# Patient Record
Sex: FEMALE | Race: WHITE | NOT HISPANIC OR LATINO | Employment: FULL TIME | URBAN - METROPOLITAN AREA
[De-identification: names, ages, dates, MRNs, and addresses within clinical notes are randomized per-mention and may not be internally consistent; named-entity substitution may affect disease eponyms.]

---

## 2017-08-01 ENCOUNTER — HOSPITAL ENCOUNTER (OUTPATIENT)
Dept: PREOP | Facility: HOSPITAL | Age: 22
Setting detail: HOSPITAL OUTPATIENT SURGERY
Discharge: HOME | End: 2017-08-01
Attending: INTERNAL MEDICINE | Admitting: INTERNAL MEDICINE

## 2017-08-01 ENCOUNTER — ON CAMPUS - OUTPATIENT (AMBULATORY)
Dept: URBAN - METROPOLITAN AREA HOSPITAL 85 | Facility: HOSPITAL | Age: 22
End: 2017-08-01

## 2017-08-01 DIAGNOSIS — K64.8 OTHER HEMORRHOIDS: ICD-10-CM

## 2017-08-01 DIAGNOSIS — K62.5 HEMORRHAGE OF ANUS AND RECTUM: ICD-10-CM

## 2017-08-01 DIAGNOSIS — K59.09 OTHER CONSTIPATION: ICD-10-CM

## 2017-08-01 PROCEDURE — 45378 DIAGNOSTIC COLONOSCOPY: CPT | Performed by: INTERNAL MEDICINE

## 2017-11-21 ENCOUNTER — OFFICE (AMBULATORY)
Dept: URBAN - METROPOLITAN AREA CLINIC 64 | Facility: CLINIC | Age: 22
End: 2017-11-21

## 2017-11-21 DIAGNOSIS — K64.1 SECOND DEGREE HEMORRHOIDS: ICD-10-CM

## 2017-11-21 PROCEDURE — 46221 LIGATION OF HEMORRHOID(S): CPT | Performed by: INTERNAL MEDICINE

## 2017-12-06 ENCOUNTER — OFFICE (AMBULATORY)
Dept: URBAN - METROPOLITAN AREA CLINIC 64 | Facility: CLINIC | Age: 22
End: 2017-12-06

## 2017-12-06 DIAGNOSIS — K64.8 OTHER HEMORRHOIDS: ICD-10-CM

## 2017-12-06 PROCEDURE — 99212 OFFICE O/P EST SF 10 MIN: CPT | Performed by: INTERNAL MEDICINE

## 2017-12-21 ENCOUNTER — OFFICE (AMBULATORY)
Dept: URBAN - METROPOLITAN AREA CLINIC 64 | Facility: CLINIC | Age: 22
End: 2017-12-21

## 2017-12-21 VITALS
HEIGHT: 63 IN | DIASTOLIC BLOOD PRESSURE: 66 MMHG | WEIGHT: 204 LBS | HEART RATE: 62 BPM | SYSTOLIC BLOOD PRESSURE: 110 MMHG

## 2017-12-21 DIAGNOSIS — K64.8 OTHER HEMORRHOIDS: ICD-10-CM

## 2017-12-21 PROCEDURE — 99213 OFFICE O/P EST LOW 20 MIN: CPT | Performed by: INTERNAL MEDICINE

## 2018-01-23 ENCOUNTER — OFFICE (AMBULATORY)
Dept: URBAN - METROPOLITAN AREA CLINIC 64 | Facility: CLINIC | Age: 23
End: 2018-01-23

## 2018-01-23 VITALS
DIASTOLIC BLOOD PRESSURE: 70 MMHG | WEIGHT: 204 LBS | HEART RATE: 85 BPM | SYSTOLIC BLOOD PRESSURE: 105 MMHG | HEIGHT: 63 IN

## 2018-01-23 DIAGNOSIS — K64.8 OTHER HEMORRHOIDS: ICD-10-CM

## 2018-01-23 DIAGNOSIS — K60.2 ANAL FISSURE, UNSPECIFIED: ICD-10-CM

## 2018-01-23 DIAGNOSIS — R10.32 LEFT LOWER QUADRANT PAIN: ICD-10-CM

## 2018-01-23 PROCEDURE — 99214 OFFICE O/P EST MOD 30 MIN: CPT | Performed by: NURSE PRACTITIONER

## 2018-01-23 RX ORDER — DICYCLOMINE HYDROCHLORIDE 20 MG/1
40 TABLET ORAL
Qty: 60 | Refills: 2 | Status: ACTIVE
Start: 2018-01-23

## 2018-01-23 RX ORDER — HYDROCORTISONE 25 MG/G
OINTMENT TOPICAL
Qty: 30 | Refills: 6 | Status: COMPLETED
Start: 2017-08-01 | End: 2018-01-23

## 2020-02-05 ENCOUNTER — HOSPITAL ENCOUNTER (EMERGENCY)
Facility: HOSPITAL | Age: 25
Discharge: HOME | End: 2020-02-05
Admitting: EMERGENCY MEDICINE

## 2020-02-05 ENCOUNTER — APPOINTMENT (OUTPATIENT)
Dept: GENERAL RADIOLOGY | Facility: HOSPITAL | Age: 25
End: 2020-02-05

## 2020-02-05 VITALS
BODY MASS INDEX: 36.72 KG/M2 | TEMPERATURE: 98.2 F | SYSTOLIC BLOOD PRESSURE: 134 MMHG | HEIGHT: 63 IN | HEART RATE: 97 BPM | RESPIRATION RATE: 17 BRPM | OXYGEN SATURATION: 95 % | WEIGHT: 207.23 LBS | DIASTOLIC BLOOD PRESSURE: 80 MMHG

## 2020-02-05 DIAGNOSIS — J11.1 INFLUENZAL BRONCHITIS: Primary | ICD-10-CM

## 2020-02-05 DIAGNOSIS — J10.1 INFLUENZA B: ICD-10-CM

## 2020-02-05 LAB
B-HCG UR QL: NEGATIVE
FLUAV SUBTYP SPEC NAA+PROBE: NOT DETECTED
FLUBV RNA ISLT QL NAA+PROBE: DETECTED
S PYO AG THROAT QL: NEGATIVE

## 2020-02-05 PROCEDURE — 81025 URINE PREGNANCY TEST: CPT | Performed by: NURSE PRACTITIONER

## 2020-02-05 PROCEDURE — 71045 X-RAY EXAM CHEST 1 VIEW: CPT

## 2020-02-05 PROCEDURE — 94640 AIRWAY INHALATION TREATMENT: CPT

## 2020-02-05 PROCEDURE — 87651 STREP A DNA AMP PROBE: CPT | Performed by: NURSE PRACTITIONER

## 2020-02-05 PROCEDURE — 87502 INFLUENZA DNA AMP PROBE: CPT | Performed by: NURSE PRACTITIONER

## 2020-02-05 PROCEDURE — 94799 UNLISTED PULMONARY SVC/PX: CPT

## 2020-02-05 PROCEDURE — 99283 EMERGENCY DEPT VISIT LOW MDM: CPT

## 2020-02-05 RX ORDER — ALBUTEROL SULFATE 90 UG/1
2 AEROSOL, METERED RESPIRATORY (INHALATION) EVERY 4 HOURS PRN
Qty: 6.7 G | Refills: 0 | Status: SHIPPED | OUTPATIENT
Start: 2020-02-05 | End: 2022-03-11

## 2020-02-05 RX ORDER — OSELTAMIVIR PHOSPHATE 75 MG/1
75 CAPSULE ORAL 2 TIMES DAILY
Qty: 10 CAPSULE | Refills: 0 | Status: SHIPPED | OUTPATIENT
Start: 2020-02-05 | End: 2020-02-10

## 2020-02-05 RX ORDER — IPRATROPIUM BROMIDE AND ALBUTEROL SULFATE 2.5; .5 MG/3ML; MG/3ML
3 SOLUTION RESPIRATORY (INHALATION) ONCE
Status: COMPLETED | OUTPATIENT
Start: 2020-02-05 | End: 2020-02-05

## 2020-02-05 RX ORDER — BROMPHENIRAMINE MALEATE, PSEUDOEPHEDRINE HYDROCHLORIDE, AND DEXTROMETHORPHAN HYDROBROMIDE 2; 30; 10 MG/5ML; MG/5ML; MG/5ML
5 SYRUP ORAL 4 TIMES DAILY PRN
Qty: 473 ML | Refills: 0 | Status: SHIPPED | OUTPATIENT
Start: 2020-02-05 | End: 2022-03-11

## 2020-02-05 RX ORDER — METHYLPREDNISOLONE 4 MG/1
TABLET ORAL
Qty: 21 TABLET | Refills: 0 | Status: SHIPPED | OUTPATIENT
Start: 2020-02-05 | End: 2022-03-11

## 2020-02-05 RX ADMIN — IPRATROPIUM BROMIDE AND ALBUTEROL SULFATE 3 ML: .5; 3 SOLUTION RESPIRATORY (INHALATION) at 10:37

## 2020-02-05 NOTE — ED PROVIDER NOTES
Subjective   24-year-old  female presents to the emergency room with complaint of cough and low-grade fever for the past 8 days.  She states that the cough is worse at night when she lays down to go to bed.  She reports a mild sore throat.  Patient denies nausea vomiting or diarrhea.  Patient reports mild body aches.  Onset: Mild cough; 8 days ago but worsening over the last 24 hours  Location: Sore throat and cough  Duration: 8 days  Character: Body aches, sore throat and nonproductive and repetitive cough  Aggravating/Alleviating Factors: Laying down flat at night to go to sleep/none  Radiation: Chest and head  Severity: Moderate            Review of Systems   Constitutional: Positive for fatigue and fever.   HENT: Positive for congestion and sore throat. Negative for trouble swallowing.    Respiratory: Positive for cough and wheezing.    Cardiovascular: Negative.    Gastrointestinal: Negative.    Genitourinary: Negative.    Musculoskeletal: Positive for arthralgias and myalgias. Negative for neck pain and neck stiffness.   Skin: Negative.  Negative for rash and wound.   Neurological: Positive for headaches. Negative for dizziness.   Hematological: Negative.    Psychiatric/Behavioral: Negative.        Past Medical History:   Diagnosis Date   • Hernia, hiatal        No Known Allergies    History reviewed. No pertinent surgical history.    No family history on file.    Social History     Socioeconomic History   • Marital status: Single     Spouse name: Not on file   • Number of children: Not on file   • Years of education: Not on file   • Highest education level: Not on file   Tobacco Use   • Smoking status: Never Smoker           Objective   Physical Exam   Constitutional: She is oriented to person, place, and time. She appears well-developed and well-nourished.   HENT:   Head: Normocephalic and atraumatic.   Right Ear: External ear normal.   Left Ear: External ear normal.   Mouth/Throat: Oropharynx is  clear and moist. No oropharyngeal exudate.   Eyes: Pupils are equal, round, and reactive to light. Conjunctivae and EOM are normal.   Neck: Normal range of motion. Neck supple.   Cardiovascular: Regular rhythm.   Pulmonary/Chest: No stridor. No respiratory distress. She has no decreased breath sounds. She has wheezes in the right middle field, the right lower field, the left middle field and the left lower field. She has rhonchi in the right middle field, the right lower field, the left middle field and the left lower field. She has no rales. She exhibits no mass, no tenderness, no deformity and no retraction.   Abdominal: Soft. Bowel sounds are normal.   Musculoskeletal: She exhibits no edema or deformity.   Neurological: She is alert and oriented to person, place, and time.   Skin: Skin is warm and dry. Capillary refill takes 2 to 3 seconds. No rash noted. No erythema.   Psychiatric: She has a normal mood and affect. Her behavior is normal. Judgment and thought content normal.   Nursing note and vitals reviewed.      Procedures           ED Course      Medications   ipratropium-albuterol (DUO-NEB) nebulizer solution 3 mL (3 mL Nebulization Given 2/5/20 1037)     Labs Reviewed   INFLUENZA ANTIGEN, RAPID - Abnormal; Notable for the following components:       Result Value    Influenza B PCR Detected (*)     All other components within normal limits   RAPID STREP A SCREEN - Normal   PREGNANCY, URINE - Normal     Xr Chest 1 View    Result Date: 2/5/2020  Negative portable chest  Electronically Signed By-Perez Koch On:2/5/2020 11:33 AM This report was finalized on [id number] by  Perez Koch, .       DuoNeb ordered and given after physical exam performed by provider.  Placed mask on patient as directed by provider.  Chest x-ray obtained and shows no acute consolidation or effusion.  Patient is flu be positive and test results explained to patient.  Medication education given to patient and patient verbalized  understanding. importance of rest and self-isolation explained to patient and patient verbalized understanding.  Discharged home with prescription for albuterol inhaler, Bromfed-DM cough syrup, Medrol Dosepak and Tamiflu.                                    [facility]    Final diagnoses:   Influenzal bronchitis   Influenza B            Sue Naik, APRN  02/05/20 1142

## 2020-02-05 NOTE — DISCHARGE INSTRUCTIONS
Rest and stay away from other people for next week.  DO NOT go in public areas and limit your contact with the public.  Drink plenty of fluids and may take tylenol/ibuprofen, as directed for body aches and fever.  DO NOT return to work until next Monday.

## 2020-02-05 NOTE — ED NOTES
Pt reports cough x8 days. Nonproductive. Low grade fever, congestion. Denies any travel or being around anyone sick.     Aline Tse, RN  02/05/20 4682

## 2022-03-11 ENCOUNTER — OFFICE VISIT (OUTPATIENT)
Dept: FAMILY MEDICINE CLINIC | Facility: CLINIC | Age: 27
End: 2022-03-11

## 2022-03-11 VITALS
BODY MASS INDEX: 37.95 KG/M2 | DIASTOLIC BLOOD PRESSURE: 74 MMHG | SYSTOLIC BLOOD PRESSURE: 126 MMHG | TEMPERATURE: 98 F | OXYGEN SATURATION: 95 % | HEIGHT: 63 IN | HEART RATE: 98 BPM | WEIGHT: 214.2 LBS

## 2022-03-11 DIAGNOSIS — F98.8 ATTENTION DEFICIT DISORDER, UNSPECIFIED HYPERACTIVITY PRESENCE: Primary | ICD-10-CM

## 2022-03-11 PROCEDURE — 99203 OFFICE O/P NEW LOW 30 MIN: CPT | Performed by: FAMILY MEDICINE

## 2022-03-11 RX ORDER — DEXTROAMPHETAMINE SACCHARATE, AMPHETAMINE ASPARTATE, DEXTROAMPHETAMINE SULFATE AND AMPHETAMINE SULFATE 5; 5; 5; 5 MG/1; MG/1; MG/1; MG/1
20 TABLET ORAL DAILY
Qty: 30 TABLET | Refills: 0 | Status: SHIPPED | OUTPATIENT
Start: 2022-03-11 | End: 2022-04-11 | Stop reason: DRUGHIGH

## 2022-03-11 NOTE — PROGRESS NOTES
Subjective   Kassi Duran is a 26 y.o. female.     History of Present Illness     The patient present with ADD and anxiety. She is C/O  difficulity concentration, focusing and anxiety but denies  depression, and  insomnia.   Patient has known history of ADD used to be on adderall.    The following portions of the patient's history were reviewed and updated as appropriate: past medical history, past social history, past surgical history and problem list.    Review of Systems   Constitutional: Positive for fatigue. Negative for activity change, appetite change and fever.   Cardiovascular: Negative for chest pain and palpitations.   Psychiatric/Behavioral: Positive for decreased concentration and stress. Negative for agitation, behavioral problems, sleep disturbance, suicidal ideas and depressed mood. The patient is nervous/anxious.        Objective   Physical Exam  Vitals reviewed.   Constitutional:       General: She is not in acute distress.  Cardiovascular:      Heart sounds: Normal heart sounds.   Pulmonary:      Effort: Pulmonary effort is normal.      Breath sounds: Normal breath sounds. No wheezing.   Abdominal:      Tenderness: There is no abdominal tenderness.   Musculoskeletal:      Cervical back: Normal range of motion and neck supple.   Neurological:      Mental Status: She is alert and oriented to person, place, and time.   Psychiatric:         Mood and Affect: Mood normal.         Behavior: Behavior normal.       Vitals:    03/11/22 0919   BP: 126/74   Pulse: 98   Temp: 98 °F (36.7 °C)   SpO2: 95%     No current outpatient medications on file prior to visit.     No current facility-administered medications on file prior to visit.         Assessment/Plan   Problems Addressed this Visit        Mental Health    Attention deficit disorder - Primary     Psychological condition is newly identified.   Rx Adderall 20 mg p.o. daily.  Discussed medication risks and side effects.  Regular aerobic  exercise.  Medication changes per orders.   The patient verified not suicidal at this time.  Psychological condition  will be reassessed in 4 weeks.           Relevant Medications    amphetamine-dextroamphetamine (Adderall) 20 MG tablet      Diagnoses       Codes Comments    Attention deficit disorder, unspecified hyperactivity presence    -  Primary ICD-10-CM: F98.8  ICD-9-CM: 314.00

## 2022-03-15 NOTE — ASSESSMENT & PLAN NOTE
Psychological condition is newly identified.   Rx Adderall 20 mg p.o. daily.  Discussed medication risks and side effects.  Regular aerobic exercise.  Medication changes per orders.   The patient verified not suicidal at this time.  Psychological condition  will be reassessed in 4 weeks.

## 2022-04-11 ENCOUNTER — OFFICE VISIT (OUTPATIENT)
Dept: FAMILY MEDICINE CLINIC | Facility: CLINIC | Age: 27
End: 2022-04-11

## 2022-04-11 VITALS
TEMPERATURE: 98.4 F | HEIGHT: 63 IN | SYSTOLIC BLOOD PRESSURE: 118 MMHG | WEIGHT: 202.6 LBS | HEART RATE: 75 BPM | BODY MASS INDEX: 35.9 KG/M2 | DIASTOLIC BLOOD PRESSURE: 78 MMHG | RESPIRATION RATE: 16 BRPM | OXYGEN SATURATION: 98 %

## 2022-04-11 DIAGNOSIS — F98.8 ATTENTION DEFICIT DISORDER, UNSPECIFIED HYPERACTIVITY PRESENCE: Primary | ICD-10-CM

## 2022-04-11 PROCEDURE — 99213 OFFICE O/P EST LOW 20 MIN: CPT | Performed by: FAMILY MEDICINE

## 2022-04-11 RX ORDER — DEXTROAMPHETAMINE SACCHARATE, AMPHETAMINE ASPARTATE MONOHYDRATE, DEXTROAMPHETAMINE SULFATE AND AMPHETAMINE SULFATE 6.25; 6.25; 6.25; 6.25 MG/1; MG/1; MG/1; MG/1
25 CAPSULE, EXTENDED RELEASE ORAL EVERY MORNING
Qty: 30 CAPSULE | Refills: 0 | Status: SHIPPED | OUTPATIENT
Start: 2022-04-11 | End: 2022-05-17 | Stop reason: SDUPTHER

## 2022-04-11 NOTE — PROGRESS NOTES
Subjective   Kassi Duran is a 27 y.o. female.     History of Present Illness   The patient present  for follow up on ADD and med refill. She has noticed improvement in symptoms, stated that in the afternoon she still has to struggle with focusing and concentration.  She denies anxiety,depression, and  insomnia.  She is taking Adderall which is helping.       The following portions of the patient's history were reviewed and updated as appropriate: past medical history, past social history, past surgical history and problem list.    Review of Systems   Constitutional: Negative for fatigue.   Cardiovascular: Negative for chest pain and palpitations.   Psychiatric/Behavioral: Positive for decreased concentration. Negative for sleep disturbance and depressed mood. The patient is not nervous/anxious.        Objective   Physical Exam  Vitals reviewed.   Neurological:      Mental Status: She is alert and oriented to person, place, and time.   Psychiatric:         Mood and Affect: Mood normal.         Behavior: Behavior normal.           Assessment/Plan   Problems Addressed this Visit        Mental Health    Attention deficit disorder - Primary     ADD symptoms are partially improving with Adderall, will increase dose to 25 mg daily in the morning.  Prescription sent.  Patient verified not suicidal at this time.  Discussed lifestyle changes and exercise.  Follow-up in a month.           Relevant Medications    amphetamine-dextroamphetamine XR (Adderall XR) 25 MG 24 hr capsule      Diagnoses       Codes Comments    Attention deficit disorder, unspecified hyperactivity presence    -  Primary ICD-10-CM: F98.8  ICD-9-CM: 314.00

## 2022-04-11 NOTE — ASSESSMENT & PLAN NOTE
ADD symptoms are partially improving with Adderall, will increase dose to 25 mg daily in the morning.  Prescription sent.  Patient verified not suicidal at this time.  Discussed lifestyle changes and exercise.  Follow-up in a month.

## 2022-05-10 ENCOUNTER — OFFICE VISIT (OUTPATIENT)
Dept: FAMILY MEDICINE CLINIC | Facility: CLINIC | Age: 27
End: 2022-05-10

## 2022-05-10 VITALS
BODY MASS INDEX: 35.15 KG/M2 | WEIGHT: 198.4 LBS | OXYGEN SATURATION: 98 % | HEART RATE: 65 BPM | DIASTOLIC BLOOD PRESSURE: 74 MMHG | TEMPERATURE: 98.4 F | SYSTOLIC BLOOD PRESSURE: 112 MMHG | HEIGHT: 63 IN | RESPIRATION RATE: 16 BRPM

## 2022-05-10 DIAGNOSIS — J02.9 SORE THROAT: ICD-10-CM

## 2022-05-10 DIAGNOSIS — F98.8 ATTENTION DEFICIT DISORDER, UNSPECIFIED HYPERACTIVITY PRESENCE: Primary | ICD-10-CM

## 2022-05-10 PROCEDURE — 87880 STREP A ASSAY W/OPTIC: CPT | Performed by: FAMILY MEDICINE

## 2022-05-10 PROCEDURE — 99213 OFFICE O/P EST LOW 20 MIN: CPT | Performed by: FAMILY MEDICINE

## 2022-05-10 NOTE — PROGRESS NOTES
Piedad Duran is a 27 y.o. female.     The patient present  for 1 month f/u on ADD. She has noted improvement in Sxdenies difficulity concentration, focusing, anxiety, depression, and  insomnia.  She is taking Adderall and tolerating well.         Sore Throat   This is a new problem. Episode onset: in 2-3 days. The problem has been gradually worsening. There has been no fever. The pain is at a severity of 4/10. The pain is mild. Associated symptoms include congestion, swollen glands and trouble swallowing. Pertinent negatives include no coughing, ear pain, headaches, hoarse voice or shortness of breath. She has tried acetaminophen for the symptoms.        The following portions of the patient's history were reviewed and updated as appropriate: past medical history, past social history, past surgical history and problem list.    Review of Systems   Constitutional: Negative for activity change, appetite change and fever.   HENT: Positive for congestion, sore throat, swollen glands and trouble swallowing. Negative for ear pain and hoarse voice.    Respiratory: Negative for cough and shortness of breath.    Psychiatric/Behavioral: Negative for decreased concentration, sleep disturbance and depressed mood. The patient is not nervous/anxious.        Objective   Physical Exam  Vitals reviewed.   Constitutional:       General: She is not in acute distress.     Appearance: She is well-developed.   HENT:      Right Ear: Tympanic membrane, ear canal and external ear normal.      Left Ear: Tympanic membrane, ear canal and external ear normal.      Nose: Rhinorrhea present.      Mouth/Throat:      Pharynx: Posterior oropharyngeal erythema present. No oropharyngeal exudate.   Eyes:      Conjunctiva/sclera: Conjunctivae normal.      Pupils: Pupils are equal, round, and reactive to light.   Neck:      Thyroid: No thyromegaly.   Cardiovascular:      Pulses: Normal pulses.      Heart sounds: Normal heart sounds.    Pulmonary:      Breath sounds: Normal breath sounds. No wheezing.   Musculoskeletal:         General: Normal range of motion.      Cervical back: Normal range of motion and neck supple. No tenderness.   Lymphadenopathy:      Cervical: No cervical adenopathy.   Neurological:      Mental Status: She is alert and oriented to person, place, and time.   Psychiatric:         Mood and Affect: Mood normal.         Behavior: Behavior normal.       Vitals:    05/10/22 1034   BP: 112/74   Pulse: 65   Resp: 16   Temp: 98.4 °F (36.9 °C)   SpO2: 98%     Current Outpatient Medications on File Prior to Visit   Medication Sig Dispense Refill   • amphetamine-dextroamphetamine XR (Adderall XR) 25 MG 24 hr capsule Take 1 capsule by mouth Every Morning 30 capsule 0     No current facility-administered medications on file prior to visit.         [unfilled]  Problems Addressed this Visit        ENT    Sore throat     Rapid strep negative- advise conservative management fluids and salt water gargles.           Relevant Orders    POCT rapid strep A       Mental Health    Attention deficit disorder - Primary     Psychological condition is improving with treatment.  Continue current treatment regimen.  Regular aerobic exercise.  Psychological condition  will be reassessed in 3 months.             Diagnoses       Codes Comments    Attention deficit disorder, unspecified hyperactivity presence    -  Primary ICD-10-CM: F98.8  ICD-9-CM: 314.00     Sore throat     ICD-10-CM: J02.9  ICD-9-CM: 462

## 2022-05-16 LAB
EXPIRATION DATE: NORMAL
INTERNAL CONTROL: NORMAL
Lab: NORMAL
S PYO AG THROAT QL: NEGATIVE

## 2022-05-17 DIAGNOSIS — F98.8 ATTENTION DEFICIT DISORDER, UNSPECIFIED HYPERACTIVITY PRESENCE: ICD-10-CM

## 2022-05-17 RX ORDER — DEXTROAMPHETAMINE SACCHARATE, AMPHETAMINE ASPARTATE MONOHYDRATE, DEXTROAMPHETAMINE SULFATE AND AMPHETAMINE SULFATE 6.25; 6.25; 6.25; 6.25 MG/1; MG/1; MG/1; MG/1
25 CAPSULE, EXTENDED RELEASE ORAL EVERY MORNING
Qty: 30 CAPSULE | Refills: 0 | Status: SHIPPED | OUTPATIENT
Start: 2022-05-17 | End: 2022-06-13 | Stop reason: SDUPTHER

## 2022-05-17 NOTE — TELEPHONE ENCOUNTER
PATIENT CALLING BACK NEEDS THIS TODAY SO SHE CAN TAKE IT TOMORROW. SHE HAS ALREADY MISSED TODAY. SHE IS VERY UPSET THAT IT WASN'T SENT LAST WEEK DURING HER APT WHEN SHE WAS TOLD IT WAS GOING TO BE. PLEASE CALL IN ASAP.     [phone]

## 2022-05-17 NOTE — TELEPHONE ENCOUNTER
Caller: Kassi Duran    Relationship: Self    Best call back number:[phone]      Requested Prescriptions:   Requested Prescriptions     Pending Prescriptions Disp Refills   • amphetamine-dextroamphetamine XR (Adderall XR) 25 MG 24 hr capsule 30 capsule 0     Sig: Take 1 capsule by mouth Every Morning        Pharmacy where request should be sent: [facility] #[id number] - [place] [facility] [address] AT [facility] OF  & [facility] - [phone] [place] [phone] FX     Additional details provided by patient: PATIENT IS OUT OF MEDICATION.,    PATIENT STATES DR DALTON TOLD HER THAT'S SHE WOULD CALL THE MEDICATION IN AFTER HER APPOINTMENT WHICH WAS ON 5/10/22 BUT THE PHARMACY DOES NOT HAVE THE REFILL.     Does the patient have less than a 3 day supply:  [x] Yes  [] No    Coni Elliott Rep   05/17/22 12:14 EDT

## 2022-05-26 ENCOUNTER — TELEPHONE (OUTPATIENT)
Dept: FAMILY MEDICINE CLINIC | Facility: CLINIC | Age: 27
End: 2022-05-26

## 2022-05-26 RX ORDER — SCOLOPAMINE TRANSDERMAL SYSTEM 1 MG/1
1 PATCH, EXTENDED RELEASE TRANSDERMAL
Qty: 4 PATCH | Refills: 0 | Status: SHIPPED | OUTPATIENT
Start: 2022-05-26 | End: 2022-12-01 | Stop reason: SDUPTHER

## 2022-05-26 NOTE — TELEPHONE ENCOUNTER
Caller: Kassi Duran    Relationship: Self    Best call back number: [phone]      What medication are you requesting: MOTION SICKNESS PATCHES THE CRUISE IS FOR 8 DAYS SO SHE WOULD NEED 3.     Have you had these symptoms before:    [] Yes  [x] No    Have you been treated for these symptoms before:   [] Yes  [x] No    If a prescription is needed, what is your preferred pharmacy and phone number: [id number] - [address] AT [facility] OF  & [facility] - [phone] [facility] [phone] FX     Additional notes:  PATIENT IS GOING ON A CRUISE, SHE IS REQUESTING TO GET SOME MOTION SICKNESS PATCHES. PLEASE ADVISE EITHER WAY.

## 2022-06-13 DIAGNOSIS — F98.8 ATTENTION DEFICIT DISORDER, UNSPECIFIED HYPERACTIVITY PRESENCE: ICD-10-CM

## 2022-06-13 RX ORDER — DEXTROAMPHETAMINE SACCHARATE, AMPHETAMINE ASPARTATE MONOHYDRATE, DEXTROAMPHETAMINE SULFATE AND AMPHETAMINE SULFATE 6.25; 6.25; 6.25; 6.25 MG/1; MG/1; MG/1; MG/1
25 CAPSULE, EXTENDED RELEASE ORAL EVERY MORNING
Qty: 30 CAPSULE | Refills: 0 | Status: SHIPPED | OUTPATIENT
Start: 2022-06-13 | End: 2022-07-12 | Stop reason: SDUPTHER

## 2022-06-13 NOTE — TELEPHONE ENCOUNTER
Caller: Kassi Duran    Relationship: Self    Best call back number: [phone]       Requested Prescriptions:   Requested Prescriptions     Pending Prescriptions Disp Refills   • amphetamine-dextroamphetamine XR (Adderall XR) 25 MG 24 hr capsule 30 capsule 0     Sig: Take 1 capsule by mouth Every Morning        Pharmacy where request should be sent: [facility] #[id number] - [address] AT [facility] OF  [address] & [facility] - [phone] [place] [phone] FX     Additional details provided by patient: PATIENT HAS ABOUT A 5 DAY SUPPLY.    PATIENT WOULD ALSO LIKE REFILLS ADDED TO THIS MEDICATION SO THAT SHE DOESN'T HAVE TO CALL EVERY MONTH     Does the patient have less than a 3 day supply:  [] Yes  [x] No    Coni Elliott Rep   06/13/22 12:05 EDT

## 2022-07-12 DIAGNOSIS — F98.8 ATTENTION DEFICIT DISORDER, UNSPECIFIED HYPERACTIVITY PRESENCE: ICD-10-CM

## 2022-07-12 NOTE — TELEPHONE ENCOUNTER
Caller: Kassi Duran    Relationship: Self    Best call back number: [phone]       Requested Prescriptions:   Requested Prescriptions     Pending Prescriptions Disp Refills   • amphetamine-dextroamphetamine XR (Adderall XR) 25 MG 24 hr capsule 30 capsule 0     Sig: Take 1 capsule by mouth Every Morning        [facility] be [id number] [place] [address] [address] AT [facility] OF  & [facility] - [phone] [place] [phone] FX     Additional details provided by patient: PATIENT HAS 4-5 DAY SUPPLY LEFT OF MEDICATION     Does the patient have less than a 3 day supply:  [] Yes  [x] No    Coni Elliott Rep   07/12/22 13:35 EDT

## 2022-07-13 RX ORDER — DEXTROAMPHETAMINE SACCHARATE, AMPHETAMINE ASPARTATE MONOHYDRATE, DEXTROAMPHETAMINE SULFATE AND AMPHETAMINE SULFATE 6.25; 6.25; 6.25; 6.25 MG/1; MG/1; MG/1; MG/1
25 CAPSULE, EXTENDED RELEASE ORAL EVERY MORNING
Qty: 30 CAPSULE | Refills: 0 | Status: SHIPPED | OUTPATIENT
Start: 2022-07-13 | End: 2022-08-19 | Stop reason: SDUPTHER

## 2022-08-08 ENCOUNTER — OFFICE VISIT (OUTPATIENT)
Dept: FAMILY MEDICINE CLINIC | Facility: CLINIC | Age: 27
End: 2022-08-08

## 2022-08-08 VITALS
OXYGEN SATURATION: 98 % | TEMPERATURE: 98.4 F | WEIGHT: 192.6 LBS | HEART RATE: 72 BPM | RESPIRATION RATE: 16 BRPM | BODY MASS INDEX: 34.12 KG/M2 | HEIGHT: 63 IN | DIASTOLIC BLOOD PRESSURE: 83 MMHG | SYSTOLIC BLOOD PRESSURE: 111 MMHG

## 2022-08-08 DIAGNOSIS — F98.8 ATTENTION DEFICIT DISORDER, UNSPECIFIED HYPERACTIVITY PRESENCE: Primary | ICD-10-CM

## 2022-08-08 PROCEDURE — 99213 OFFICE O/P EST LOW 20 MIN: CPT | Performed by: FAMILY MEDICINE

## 2022-08-08 NOTE — ASSESSMENT & PLAN NOTE
ADD symptoms are improving continue current dose of Adderall.   Discussed dietary changes and lifestyle modification.

## 2022-08-08 NOTE — PROGRESS NOTES
Piedad Duran is a 27 y.o. female.     History of Present Illness     The patient present  for 3 months f/u on ADD and med refill. She is doing well taking adderall XR and tolerating well. She denies difficulity concentration, focusing, anxiety, depression, and  insomnia.        The following portions of the patient's history were reviewed and updated as appropriate: past medical history, past social history, past surgical history and problem list.    Review of Systems   Constitutional: Negative for fatigue.   Cardiovascular: Negative for chest pain and palpitations.   Neurological: Negative for headache.   Psychiatric/Behavioral: Negative for decreased concentration, sleep disturbance and depressed mood. The patient is not nervous/anxious.        Objective   Physical Exam  Vitals reviewed.   Constitutional:       Appearance: She is well-developed.   Neck:      Thyroid: No thyromegaly.   Pulmonary:      Effort: Pulmonary effort is normal.      Breath sounds: Normal breath sounds.   Musculoskeletal:         General: Normal range of motion.   Neurological:      Mental Status: She is alert and oriented to person, place, and time.   Psychiatric:         Mood and Affect: Mood normal.         Behavior: Behavior normal.       Vitals:    08/08/22 0823   BP: 111/83   Pulse: 72   Resp: 16   Temp: 98.4 °F (36.9 °C)   SpO2: 98%     Body mass index is 34.13 kg/m².  Current Outpatient Medications on File Prior to Visit   Medication Sig Dispense Refill   • amphetamine-dextroamphetamine XR (Adderall XR) 25 MG 24 hr capsule Take 1 capsule by mouth Every Morning 30 capsule 0   • Scopolamine (Transderm-Scop, 1.5 MG,) 1 MG/3DAYS patch Place 1 patch on the skin as directed by provider Every 72 (Seventy-Two) Hours. 4 patch 0     No current facility-administered medications on file prior to visit.         Assessment & Plan   Problems Addressed this Visit        Mental Health    Attention deficit disorder - Primary      ADD symptoms are improving continue current dose of Adderall.   Discussed dietary changes and lifestyle modification.           Diagnoses       Codes Comments    Attention deficit disorder, unspecified hyperactivity presence    -  Primary ICD-10-CM: F98.8  ICD-9-CM: 314.00

## 2022-08-19 DIAGNOSIS — F98.8 ATTENTION DEFICIT DISORDER, UNSPECIFIED HYPERACTIVITY PRESENCE: ICD-10-CM

## 2022-08-19 RX ORDER — DEXTROAMPHETAMINE SACCHARATE, AMPHETAMINE ASPARTATE MONOHYDRATE, DEXTROAMPHETAMINE SULFATE AND AMPHETAMINE SULFATE 6.25; 6.25; 6.25; 6.25 MG/1; MG/1; MG/1; MG/1
25 CAPSULE, EXTENDED RELEASE ORAL EVERY MORNING
Qty: 10 CAPSULE | Refills: 0 | Status: SHIPPED | OUTPATIENT
Start: 2022-08-19 | End: 2022-09-08 | Stop reason: SDUPTHER

## 2022-08-19 RX ORDER — DEXTROAMPHETAMINE SACCHARATE, AMPHETAMINE ASPARTATE MONOHYDRATE, DEXTROAMPHETAMINE SULFATE AND AMPHETAMINE SULFATE 6.25; 6.25; 6.25; 6.25 MG/1; MG/1; MG/1; MG/1
25 CAPSULE, EXTENDED RELEASE ORAL EVERY MORNING
Qty: 30 CAPSULE | Refills: 0 | Status: SHIPPED | OUTPATIENT
Start: 2022-08-19 | End: 2022-08-19 | Stop reason: SDUPTHER

## 2022-08-19 NOTE — TELEPHONE ENCOUNTER
LEFT MESSAGE FOR PT ON CELL VM HER RX WAS SENT TO THE PHARM [facility]      I do not see any refill request or chart message until today.  Cristina Santiago

## 2022-08-19 NOTE — TELEPHONE ENCOUNTER
Patient called and is in tears, she says she is waiting for this refill and she has ask multiple times.  She says she is going days without her medicine due to issue with getting her refill requests answered in a timely manner.      Thanks

## 2022-08-19 NOTE — TELEPHONE ENCOUNTER
Caller: Kassi Duran    Relationship: Self    Best call back number:[phone]    Requested Prescriptions:   Requested Prescriptions     Pending Prescriptions Disp Refills   • amphetamine-dextroamphetamine XR (Adderall XR) 25 MG 24 hr capsule 30 capsule 0     Sig: Take 1 capsule by mouth Every Morning        Pharmacy where request should be sent: [facility] [place][id number] - [place] [address] [address] AT [facility] OF  [address] & [facility] - [phone] [facility] [phone] FX     Does the patient have less than a 3 day supply:  [x] Yes  [] No    Coni MORE Rep   08/19/22 10:42 EDT

## 2022-08-31 ENCOUNTER — CLINICAL SUPPORT (OUTPATIENT)
Dept: FAMILY MEDICINE CLINIC | Facility: CLINIC | Age: 27
End: 2022-08-31

## 2022-08-31 DIAGNOSIS — Z11.1 ENCOUNTER FOR TB TINE TEST: Primary | ICD-10-CM

## 2022-09-02 ENCOUNTER — CLINICAL SUPPORT (OUTPATIENT)
Dept: FAMILY MEDICINE CLINIC | Facility: CLINIC | Age: 27
End: 2022-09-02

## 2022-09-02 DIAGNOSIS — Z23 NEED FOR VACCINATION: Primary | ICD-10-CM

## 2022-09-02 PROCEDURE — 86580 TB INTRADERMAL TEST: CPT | Performed by: FAMILY MEDICINE

## 2022-09-07 ENCOUNTER — CLINICAL SUPPORT (OUTPATIENT)
Dept: FAMILY MEDICINE CLINIC | Facility: CLINIC | Age: 27
End: 2022-09-07

## 2022-09-07 ENCOUNTER — TELEPHONE (OUTPATIENT)
Dept: FAMILY MEDICINE CLINIC | Facility: CLINIC | Age: 27
End: 2022-09-07

## 2022-09-07 DIAGNOSIS — Z23 NEED FOR VACCINATION: Primary | ICD-10-CM

## 2022-09-07 PROCEDURE — 86580 TB INTRADERMAL TEST: CPT | Performed by: FAMILY MEDICINE

## 2022-09-07 NOTE — TELEPHONE ENCOUNTER
Patient said in January of 2021she got the Pfizer covid shot. Then in November 2021 she got the Francisco J and Francisco J Covid shot. She wants to know if she needs the Covid Booster please

## 2022-09-08 DIAGNOSIS — F98.8 ATTENTION DEFICIT DISORDER, UNSPECIFIED HYPERACTIVITY PRESENCE: ICD-10-CM

## 2022-09-08 RX ORDER — DEXTROAMPHETAMINE SACCHARATE, AMPHETAMINE ASPARTATE MONOHYDRATE, DEXTROAMPHETAMINE SULFATE AND AMPHETAMINE SULFATE 6.25; 6.25; 6.25; 6.25 MG/1; MG/1; MG/1; MG/1
25 CAPSULE, EXTENDED RELEASE ORAL EVERY MORNING
Qty: 10 CAPSULE | Refills: 0 | Status: SHIPPED | OUTPATIENT
Start: 2022-09-08 | End: 2022-09-12 | Stop reason: SDUPTHER

## 2022-09-08 NOTE — TELEPHONE ENCOUNTER
Caller: Kassi Duran    Relationship: Self    Best call back number: [phone]    Requested Prescriptions:   Requested Prescriptions     Pending Prescriptions Disp Refills   • amphetamine-dextroamphetamine XR (Adderall XR) 25 MG 24 hr capsule 10 capsule 0     Sig: Take 1 capsule by mouth Every Morning        Pharmacy where request should be sent: [facility] #[id number] [facility] [address] [place] [address] AT [facility] & [facility] [phone] [place] [phone]      Additional details provided by patient: PATIENT STATES SHE IS ONLY NEEDING A 10 DAY PRESCRIPTION OF THIS MEDICATION SENT TO THE [facility] ON [facility] AND SHE ONLY HAS 2 DAYS LEFT ON THIS PRESCRIPTION.   Does the patient have less than a 3 day supply:  [x] Yes  [] No    Coni Garzon Rep   09/08/22 13:57 EDT

## 2022-09-09 ENCOUNTER — CLINICAL SUPPORT (OUTPATIENT)
Dept: FAMILY MEDICINE CLINIC | Facility: CLINIC | Age: 27
End: 2022-09-09

## 2022-09-09 DIAGNOSIS — Z23 NEED FOR VACCINATION: ICD-10-CM

## 2022-09-09 DIAGNOSIS — F98.8 ATTENTION DEFICIT DISORDER, UNSPECIFIED HYPERACTIVITY PRESENCE: Primary | ICD-10-CM

## 2022-09-09 PROCEDURE — 86580 TB INTRADERMAL TEST: CPT | Performed by: FAMILY MEDICINE

## 2022-09-11 ENCOUNTER — APPOINTMENT (OUTPATIENT)
Dept: GENERAL RADIOLOGY | Facility: HOSPITAL | Age: 27
End: 2022-09-11

## 2022-09-11 ENCOUNTER — HOSPITAL ENCOUNTER (EMERGENCY)
Facility: HOSPITAL | Age: 27
Discharge: HOME | End: 2022-09-11
Attending: EMERGENCY MEDICINE | Admitting: EMERGENCY MEDICINE

## 2022-09-11 ENCOUNTER — APPOINTMENT (OUTPATIENT)
Dept: CT IMAGING | Facility: HOSPITAL | Age: 27
End: 2022-09-11

## 2022-09-11 VITALS
RESPIRATION RATE: 18 BRPM | BODY MASS INDEX: 34.55 KG/M2 | OXYGEN SATURATION: 100 % | TEMPERATURE: 98 F | HEIGHT: 63 IN | HEART RATE: 75 BPM | SYSTOLIC BLOOD PRESSURE: 137 MMHG | DIASTOLIC BLOOD PRESSURE: 90 MMHG | WEIGHT: 195 LBS

## 2022-09-11 DIAGNOSIS — S43.102A SEPARATION OF LEFT ACROMIOCLAVICULAR JOINT, INITIAL ENCOUNTER: ICD-10-CM

## 2022-09-11 DIAGNOSIS — S43.401A SPRAIN OF RIGHT SHOULDER, UNSPECIFIED SHOULDER SPRAIN TYPE, INITIAL ENCOUNTER: ICD-10-CM
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
## 2022-09-11 DIAGNOSIS — S46.002A INJURY OF LEFT ROTATOR CUFF, INITIAL ENCOUNTER: ICD-10-CM

## 2022-09-11 DIAGNOSIS — S00.03XA CONTUSION OF SCALP, INITIAL ENCOUNTER: ICD-10-CM

## 2022-09-11 PROCEDURE — 25010000002 ONDANSETRON PER 1 MG: Performed by: EMERGENCY MEDICINE

## 2022-09-11 PROCEDURE — 73030 X-RAY EXAM OF SHOULDER: CPT

## 2022-09-11 PROCEDURE — 72125 CT NECK SPINE W/O DYE: CPT

## 2022-09-11 PROCEDURE — 73010 X-RAY EXAM OF SHOULDER BLADE: CPT

## 2022-09-11 PROCEDURE — 70450 CT HEAD/BRAIN W/O DYE: CPT

## 2022-09-11 PROCEDURE — 96374 THER/PROPH/DIAG INJ IV PUSH: CPT

## 2022-09-11 PROCEDURE — 99283 EMERGENCY DEPT VISIT LOW MDM: CPT

## 2022-09-11 RX ORDER — ONDANSETRON 2 MG/ML
4 INJECTION INTRAMUSCULAR; INTRAVENOUS ONCE
Status: COMPLETED | OUTPATIENT
Start: 2022-09-11 | End: 2022-09-11

## 2022-09-11 RX ORDER — SODIUM CHLORIDE 0.9 % (FLUSH) 0.9 %
10 SYRINGE (ML) INJECTION AS NEEDED
Status: DISCONTINUED | OUTPATIENT
Start: 2022-09-11 | End: 2022-09-11 | Stop reason: HOSPADM

## 2022-09-11 RX ORDER — NAPROXEN 500 MG/1
500 TABLET ORAL 2 TIMES DAILY PRN
Qty: 14 TABLET | Refills: 0 | Status: SHIPPED | OUTPATIENT
Start: 2022-09-11 | End: 2022-12-19

## 2022-09-11 RX ORDER — HYDROCODONE BITARTRATE AND ACETAMINOPHEN 5; 325 MG/1; MG/1
1 TABLET ORAL EVERY 6 HOURS PRN
Qty: 12 TABLET | Refills: 0 | Status: SHIPPED | OUTPATIENT
Start: 2022-09-11 | End: 2022-12-19

## 2022-09-11 RX ORDER — ACETAMINOPHEN 500 MG
1000 TABLET ORAL ONCE
Status: COMPLETED | OUTPATIENT
Start: 2022-09-11 | End: 2022-09-11

## 2022-09-11 RX ADMIN — ACETAMINOPHEN 1000 MG: 500 TABLET ORAL at 20:05

## 2022-09-11 RX ADMIN — ONDANSETRON 4 MG: 2 INJECTION INTRAMUSCULAR; INTRAVENOUS at 19:00

## 2022-09-12 ENCOUNTER — TELEPHONE (OUTPATIENT)
Dept: FAMILY MEDICINE CLINIC | Facility: CLINIC | Age: 27
End: 2022-09-12

## 2022-09-12 DIAGNOSIS — F98.8 ATTENTION DEFICIT DISORDER, UNSPECIFIED HYPERACTIVITY PRESENCE: ICD-10-CM

## 2022-09-12 RX ORDER — CYCLOBENZAPRINE HCL 10 MG
10 TABLET ORAL NIGHTLY PRN
Qty: 15 TABLET | Refills: 0 | Status: SHIPPED | OUTPATIENT
Start: 2022-09-12 | End: 2022-12-19

## 2022-09-12 NOTE — DISCHARGE INSTRUCTIONS
Sling but passive range of motion every day to avoid frozen shoulder.  Ice packs  Neurochecks every 4 hours return for vomiting altered mental status unequal pupils severe headache uncontrolled pain or any other new or worsening problems or concerns.  No activity at risk head injury for at least 1 week and symptom-free and cleared by her primary care doctor.  Naprosyn sent to your pharmacy.  Use Pepcid AC while taking anti-inflammatory.  Return for any other new or worsening problems or concerns.  Norco sent to your pharmacy this will make you sleepy and constipated increase fluid and fiber can be addicting limit use.  Ice packs to the shoulder

## 2022-09-12 NOTE — TELEPHONE ENCOUNTER
Please inform patient I have sent prescription for Flexeril which is a muscle relaxant take 1 tablet at bedtime.

## 2022-09-12 NOTE — ED PROVIDER NOTES
Subjective   PIT    Chief complaint fall shoulder pain head injury    History of present is a 27-year-old female that was playing football she went to the garage because it was raining and the floor was wet and she fell onto her left shoulder she hit her head but had no loss of consciousness but she states she has vomited a couple times.  She really did not even have a headache.  Patient states that he has severe shoulder pain which is throbbing aching in nature and worse when she moves it and feels kind of numb in her hand.  Is been ongoing for last couple hours.  No neck pain or back pain.  No previous shoulder injury.  No other complaints or associated injury at this time          Review of Systems   Constitutional: Negative for chills and fever.   Eyes: Negative for photophobia and visual disturbance.   Respiratory: Negative for chest tightness and shortness of breath.    Gastrointestinal: Positive for vomiting. Negative for abdominal pain.   Musculoskeletal: Negative for back pain and neck pain.   Skin: Negative for color change and wound.   Neurological: Negative for facial asymmetry, speech difficulty and headaches.   Psychiatric/Behavioral: Negative for agitation and confusion.       Past Medical History:   Diagnosis Date   • Hernia, hiatal        No Known Allergies    No past surgical history on file.    No family history on file.    Social History     Socioeconomic History   • Marital status: Single   Tobacco Use   • Smoking status: Never Smoker       Prior to Admission medications    Medication Sig Start Date End Date Taking? Authorizing Provider   amphetamine-dextroamphetamine XR (Adderall XR) 25 MG 24 hr capsule Take 1 capsule by mouth Every Morning 9/8/22   Cristina Santiago MD   HYDROcodone-acetaminophen (NORCO) 5-325 MG per tablet Take 1 tablet by mouth Every 6 (Six) Hours As Needed for Severe Pain. 9/11/22   Perez Mckeon MD   naproxen (EC NAPROSYN) 500 MG EC tablet Take 1 tablet by mouth 2 (Two)  Times a Day As Needed (Pain). 9/11/22   Perez Mckeon MD   Scopolamine (Transderm-Scop, 1.5 MG,) 1 MG/3DAYS patch Place 1 patch on the skin as directed by provider Every 72 (Seventy-Two) Hours. 5/26/22   Cristina Santiago MD         Objective   Physical Exam  Constitutional 27-year-old female awake alert nontoxic-appearing.  Temperature 98 blood pressure 137/90 heart rate 75.  HEENT extraocular muscles are intact pupils equal round react there is no photophobia there is no raccoon or pitts sign.  Back no cervical thoracic lumbar spine tenderness palpation percussion.  She does have tenderness to the posterior shoulder and posterior scapula on the left but no obvious deformity no posterior rib tenderness is noted.  Neck supple full range of motion no meningeal signs no JVD.  She can move it without difficulty and there is no pain.  Lungs clear no retraction heart regular without murmur.  Abdomen soft without tenderness or bruising good bowel sounds extremities right arm and legs full range of motion without deformities.  Patient has some pain to the left shoulder with limited range of motion.  I can move it through full range of motion she has pain to the AC joint and posterior shoulder and scapular area but no step-off or deformity.  Patient has no pain to the elbow or through the wrist or snuffbox.  No pain throughout the fingers.  She is distally neurovascular motor sensor intact including the deltoid tricep forearm hand.  She can move her thumb and fingers and wrist through full range of motion without any difficulty at all.  Opens and closes the hand  strength normal flexes the fingers and wrist without difficulty moves the thumb through full range of motion.  Patient has pain with abduction abduction of the shoulder especially past 90 degrees.  She is unable to do it on her own.  There is no red hot swollen joint no obvious dislocation neurologic awake alert and orientated x4 with Merlin Coma Scale  15  Procedures           ED Course        XR Scapula Left    Result Date: 9/11/2022  Negative for fracture  Electronically Signed ByRangel Colvin On:9/11/2022 7:02 PM This report was finalized on [id number] by  Shandra Johnson    XR Shoulder 2+ View Left    Result Date: 9/11/2022   1. No evidence of fracture 2. Possible acromioclavicular injury. Correlate with exam findings  Electronically Signed By-Rashel Colvin On:9/11/2022 7:02 PM This report was finalized on [id number] by  Shandra Johnson    CT Head Without Contrast    Result Date: 9/11/2022  No evidence of intracranial injury  Electronically Signed By-Rashel Colvin On:9/11/2022 7:52 PM This report was finalized on [id number] by  Rashel Colvin, Shandra    CT Cervical Spine Without Contrast    Result Date: 9/11/2022  No evidence of cervical spine fracture  Electronically Signed ByRangel Colvin On:9/11/2022 7:54 PM This report was finalized on [id number] by  Shandra Johnson    Medications   sodium chloride 0.9 % flush 10 mL (has no administration in time range)   morphine injection 4 mg (4 mg Intravenous Not Given 9/11/22 1900)   ondansetron (ZOFRAN) injection 4 mg (4 mg Intravenous Given 9/11/22 1900)   acetaminophen (TYLENOL) tablet 1,000 mg (1,000 mg Oral Given 9/11/22 2005)                                            MDM  Number of Diagnoses or Management Options  Contusion of scalp, initial encounter: new and requires workup  Fall, initial encounter: new and requires workup  Injury of left rotator cuff, initial encounter: new and requires workup  Separation of left acromioclavicular joint, initial encounter: new and requires workup  Sprain of right shoulder, unspecified shoulder sprain type, initial encounter: new and requires workup  Diagnosis management comments: Medical decision making.  Patient IV established given for morphine IV for Zofran IV and had the above evaluation x-rays of the shoulder and scapula no fracture was noted but  a little bit of widening to the AC joint was noted.  The scapula looked okay.  This is reviewed by me here.  CT scan of the head obtained without reviewed by me as well as radiology without acute findings and CT cervical spine without acute findings reviewed by me as well as radiology.  I suspect the patient has a rotator cuff injury as well as his AC because she is tender at the AC joint there is no obvious deformity at that joint.  There is no dislocation or fracture.  We talked about these findings.  And stressed importance of follow-up with the orthopedist for potential surgical evaluation.  She remained awake and alert with a Vanleer Coma Scale 15.  We talked about handed precautions and what to return for and she voiced understanding she remained distally neurovascular is intact including deltoid tricep forearm hand area.  No evidence of compartment syndrome no pain with passive stretching compartments are soft to palpation.  She was stable and discharged home after sling applied.  We talked about appropriate splint care stable unremarkable ER course.       Amount and/or Complexity of Data Reviewed  Tests in the radiology section of CPT®: reviewed    Risk of Complications, Morbidity, and/or Mortality  Presenting problems: moderate  Diagnostic procedures: moderate  Management options: moderate    Patient Progress  Patient progress: stable      Final diagnoses:   Fall, initial encounter   Contusion of scalp, initial encounter   Sprain of right shoulder, unspecified shoulder sprain type, initial encounter   Separation of left acromioclavicular joint, initial encounter   Injury of left rotator cuff, initial encounter       ED Disposition  ED Disposition     ED Disposition   Discharge    Condition   Stable    Comment   --             Skinny Coppola II, MD  [address] [address]  [phone]    Schedule an appointment as soon as possible for a visit   Schedule appointment for  the next 3 to 4 days by the end of the week for recheck         Medication List      New Prescriptions    HYDROcodone-acetaminophen 5-325 MG per tablet  Commonly known as: NORCO  Take 1 tablet by mouth Every 6 (Six) Hours As Needed for Severe Pain.     naproxen 500 MG EC tablet  Commonly known as: EC NAPROSYN  Take 1 tablet by mouth 2 (Two) Times a Day As Needed (Pain).           Where to Get Your Medications      These medications were sent to [facility] #[id number] - [place], [place] - [address] [place] [address] AT [facility] OF  &  - [phone]  - [phone] [address], [place] [address]    Phone: [phone]   · HYDROcodone-acetaminophen 5-325 MG per tablet  · naproxen 500 MG EC tablet          Perez Mckeon MD  09/12/22 0059

## 2022-09-12 NOTE — TELEPHONE ENCOUNTER
Caller: Kassi Duran    Relationship: Self    Best call back number: [phone]    What is the best time to reach you: ANY TIME    Who are you requesting to speak with (clinical staff, provider,  specific staff member): CLINICAL STAFF    What was the call regarding: PATIENT CALLED STATED THAT SHE WAS SEEN AT [facility] ED ON 9/11/22.  STATES THAT SHE TORE HER LEFT ROTOR CUFF AND THE MEDICINE (NORCO AND NAPROXEN) THAT THE ED GAVE HER FOR PAIN IS NOT WORKING.     [id number] [place] [place] [address] [address] [address] AT [facility] OF  &  - [phone]  - [phone] FX    SHE HAS APPOINTMENT WITH ORTHO SURGEON DR. MCADAMS IN [place]  ON Wednesday 09/14/22 BUT STATES THAT SHE NEEDS SOMETHING STRONGER NOW LIKE A MUSCLE RELAXER TO HELP ALLEVIATE HER PAIN.    PLEASE ADVISE.      Do you require a callback: YES

## 2022-09-12 NOTE — TELEPHONE ENCOUNTER
Caller: Kassi Duran    Relationship: Self    Best call back number:[phone]    Requested Prescriptions:   Requested Prescriptions     Pending Prescriptions Disp Refills   • amphetamine-dextroamphetamine XR (Adderall XR) 25 MG 24 hr capsule 10 capsule 0     Sig: Take 1 capsule by mouth Every Morning        Pharmacy where request should be sent: [facility]/PHARMACY #[id number] - [place], [place] - [address] [facility] - [phone]  - [phone] FX     Additional details provided by patient: OUT OF MEDICATION .PLEASE SEND PRESCRIPTION TO [facility] PHARM[facility]Y LISTED ABOVE. [facility] IS OUT OF THE MEDICATION    Does the patient have less than a 3 day supply:  [x] Yes  [] No    Coni Mast Rep   09/12/22 14:18 EDT

## 2022-09-12 NOTE — TELEPHONE ENCOUNTER
Patient notified via voicemail . Advised to call back if she has any questions or concerns on 9/12 @ 3:59PM .

## 2022-09-13 RX ORDER — DEXTROAMPHETAMINE SACCHARATE, AMPHETAMINE ASPARTATE MONOHYDRATE, DEXTROAMPHETAMINE SULFATE AND AMPHETAMINE SULFATE 6.25; 6.25; 6.25; 6.25 MG/1; MG/1; MG/1; MG/1
25 CAPSULE, EXTENDED RELEASE ORAL EVERY MORNING
Qty: 10 CAPSULE | Refills: 0 | Status: SHIPPED | OUTPATIENT
Start: 2022-09-13 | End: 2022-09-20 | Stop reason: SDUPTHER

## 2022-09-13 NOTE — TELEPHONE ENCOUNTER
Patient notified via voicemail . Advised to call back if she has any questions or concerns on 9/13 @ 10:41AM .

## 2022-09-20 DIAGNOSIS — F98.8 ATTENTION DEFICIT DISORDER, UNSPECIFIED HYPERACTIVITY PRESENCE: ICD-10-CM

## 2022-09-20 RX ORDER — DEXTROAMPHETAMINE SACCHARATE, AMPHETAMINE ASPARTATE MONOHYDRATE, DEXTROAMPHETAMINE SULFATE AND AMPHETAMINE SULFATE 6.25; 6.25; 6.25; 6.25 MG/1; MG/1; MG/1; MG/1
25 CAPSULE, EXTENDED RELEASE ORAL EVERY MORNING
Qty: 30 CAPSULE | Refills: 0 | Status: SHIPPED | OUTPATIENT
Start: 2022-09-20 | End: 2022-10-24 | Stop reason: SDUPTHER

## 2022-09-20 NOTE — TELEPHONE ENCOUNTER
Caller: Kassi Duran    Relationship: Self    Best call back number: [phone] (H)    Requested Prescriptions:   Requested Prescriptions     Pending Prescriptions Disp Refills   • amphetamine-dextroamphetamine XR (Adderall XR) 25 MG 24 hr capsule 10 capsule 0     Sig: Take 1 capsule by mouth Every Morning        Pharmacy where request should be sent:  [facility]/pharmacy #[id number] - [place], [place] - [address] [facility] - [phone] [facility] [phone] FX    Additional details provided by patient: LAST TIE PATIENT HAD THIS FILLED, IT WAS FOR ONLY 10 TABLETS DUE TO THE PHARMACY NOT HAVING ENOUGH IN STOCK- NEXT TIME THIS MEDICATION WAS FILLED, IT WAS ONLY FOR 10 PILLS AGAIN, AS OF YESTERDAY 09/19/22. PATIENT IS NEEDING US TO SEND IN A NEW PRESCRIPTION FOR A 30 DAY SUPPLY LIKE SHE'S USED TO     PLEASE ADVISE PATIENT     Does the patient have less than a 3 day supply:  [x] Yes  [] No    Jennifer Hardy, PCT   09/20/22 08:12 EDT

## 2022-09-20 NOTE — TELEPHONE ENCOUNTER
Patient notified via voicemail . Advised to call back if she has any questions or concerns on 9/20 @ 8:53AM .

## 2022-10-24 DIAGNOSIS — F98.8 ATTENTION DEFICIT DISORDER, UNSPECIFIED HYPERACTIVITY PRESENCE: ICD-10-CM

## 2022-10-24 NOTE — TELEPHONE ENCOUNTER
Caller: Kassi Duran    Relationship: Self    Best call back number: [phone]       Requested Prescriptions:   Requested Prescriptions     Pending Prescriptions Disp Refills   • amphetamine-dextroamphetamine XR (Adderall XR) 25 MG 24 hr capsule 30 capsule 0     Sig: Take 1 capsule by mouth Every Morning        Pharmacy where request should be sent: [facility]/PHARMACY #[id number] - [place], [place] - [address] [facility] - [phone]  - [phone] FX     Additional details provided by patient:     2 DAY SUPPLY     Does the patient have less than a 3 day supply:  [x] Yes  [] No    Coni Mathews Rep   10/24/22 13:17 EDT

## 2022-10-25 RX ORDER — DEXTROAMPHETAMINE SACCHARATE, AMPHETAMINE ASPARTATE MONOHYDRATE, DEXTROAMPHETAMINE SULFATE AND AMPHETAMINE SULFATE 6.25; 6.25; 6.25; 6.25 MG/1; MG/1; MG/1; MG/1
25 CAPSULE, EXTENDED RELEASE ORAL EVERY MORNING
Qty: 30 CAPSULE | Refills: 0 | Status: SHIPPED | OUTPATIENT
Start: 2022-10-25 | End: 2022-11-15 | Stop reason: SDUPTHER

## 2022-11-15 DIAGNOSIS — F98.8 ATTENTION DEFICIT DISORDER, UNSPECIFIED HYPERACTIVITY PRESENCE: ICD-10-CM

## 2022-11-15 NOTE — TELEPHONE ENCOUNTER
Caller: Kassi Duran    Relationship: Self    Best call back number: [phone]     Requested Prescriptions:   Requested Prescriptions     Pending Prescriptions Disp Refills   • amphetamine-dextroamphetamine XR (Adderall XR) 25 MG 24 hr capsule 30 capsule 0     Sig: Take 1 capsule by mouth Every Morning        Pharmacy where request should be sent: [id number] [place] [address] [address] AT [facility] OF  & [facility] - [phone] [address] [phone] FX     Additional details provided by patient: PATIENT HAS A 2 DAY SUPPLY LEFT OF THIS MEDICATION     Does the patient have less than a 3 day supply:  [x] Yes  [] No    Coni Hunt Rep   11/15/22 14:11 EST

## 2022-11-21 RX ORDER — DEXTROAMPHETAMINE SACCHARATE, AMPHETAMINE ASPARTATE MONOHYDRATE, DEXTROAMPHETAMINE SULFATE AND AMPHETAMINE SULFATE 6.25; 6.25; 6.25; 6.25 MG/1; MG/1; MG/1; MG/1
25 CAPSULE, EXTENDED RELEASE ORAL EVERY MORNING
Qty: 30 CAPSULE | Refills: 0 | Status: SHIPPED | OUTPATIENT
Start: 2022-11-21 | End: 2022-12-19 | Stop reason: SDUPTHER

## 2022-11-21 NOTE — TELEPHONE ENCOUNTER
Patient notified via voicemail. Advised to call back if she has any questions or concerns on 11/21 @ 11:07AM.

## 2022-11-22 NOTE — TELEPHONE ENCOUNTER
PATIENT IS CALLING IN SHE STATES THAT PHARMACY IS TELLING HER THEY DO NOT HAVE THIS PRESCRIPTION AND SHE HAS BEEN OUT FOR FOUR DAYS NOW.      PLEASE ADVISE    CALLBACK NUMBER IS  [phone]      CONFIRMED PHARMACY  [facility] #[id number] - [place], [place] - [address] AT [facility] OF  &  - [phone]  - [phone] FX

## 2022-11-22 NOTE — TELEPHONE ENCOUNTER
"PATIENT CALLED BACK TO CHECK THE STATUS OF THIS. I ADVISED HER THAT THE PHARMACY CONFIRMED THEY RECEIVED THE SCRIPT, SHE STATES THAT THE PHARMACY DID NOT RECEIVE IT. I ADVISED HER I COULD SEND IT ABCK TO THEM, SHE STATES SHE IS WORRIED THAT INSURANCE WON'T COVER IT IF WE SEND IT TOO MANY TIMES. I ADVISED HER THAT SHE COULD CALL HER INS COMPANY TO SEE WHAT THEY WANTED. SHE GOT UPSET AND EXPLAINED THAT SHE ALWAYS HAS TO WAIT ON US AND THAT SHE GETS OFF WORK AT 3:30 AND THAT THERES NEVER ENOUGH TIME TO CONTACT HER PHARMACY AND THEN US IN TIME. I STATED AGAIN THAT WE DID SEND HER SCRIPT AND THAT THE PHARMACY STATED THEY RECEIVED IT. SHE GOT UPSET AGAIN AND REPEATEDLY SAID \"OKAY\" AND SAID SHE WOULD CALL THEM AND ENDED THE CALL.  "

## 2022-12-01 ENCOUNTER — TELEPHONE (OUTPATIENT)
Dept: FAMILY MEDICINE CLINIC | Facility: CLINIC | Age: 27
End: 2022-12-01

## 2022-12-01 RX ORDER — SCOLOPAMINE TRANSDERMAL SYSTEM 1 MG/1
1 PATCH, EXTENDED RELEASE TRANSDERMAL
Qty: 4 PATCH | Refills: 0 | Status: SHIPPED | OUTPATIENT
Start: 2022-12-01 | End: 2022-12-19

## 2022-12-01 RX ORDER — ONDANSETRON 4 MG/1
4 TABLET, ORALLY DISINTEGRATING ORAL EVERY 8 HOURS PRN
Qty: 10 TABLET | Refills: 0 | Status: SHIPPED | OUTPATIENT
Start: 2022-12-01 | End: 2022-12-19

## 2022-12-01 NOTE — TELEPHONE ENCOUNTER
Caller: Kassi Duran    Relationship: Self    Best call back number: [phone] (Mobile)    What medication are you requesting: SOME PATCHES FOR MOTION SICKNESS & ZOFRAN.    What are your current symptoms: GOING CRUISE AND HAS MOTION SICKNESS    How long have you been experiencing symptoms:     Have you had these symptoms before:    [] Yes  [] No    Have you been treated for these symptoms before:   [] Yes  [] No    If a prescription is needed, what is your preferred pharmacy and phone number:  [facility] [place][id number] - [place] [address] [address] [facility] [address] AT [facility] OF  &  [address] - [phone]  - [phone] FX        Additional notes: PLEASE CONTACT PATIENT TO ADVISE.          THANKS

## 2022-12-02 NOTE — TELEPHONE ENCOUNTER
Patient states she wants the patches called in and she stated she wants enough for a 12 day duration. Please advise.

## 2022-12-02 NOTE — TELEPHONE ENCOUNTER
Patient notified via voicemail. Advised to call back if she has any questions or concerns on 12/2 @ 4:29PM.

## 2022-12-19 ENCOUNTER — OFFICE VISIT (OUTPATIENT)
Dept: FAMILY MEDICINE CLINIC | Facility: CLINIC | Age: 27
End: 2022-12-19

## 2022-12-19 VITALS
RESPIRATION RATE: 16 BRPM | WEIGHT: 193.2 LBS | HEIGHT: 63 IN | TEMPERATURE: 97.5 F | HEART RATE: 78 BPM | OXYGEN SATURATION: 100 % | SYSTOLIC BLOOD PRESSURE: 138 MMHG | BODY MASS INDEX: 34.23 KG/M2 | DIASTOLIC BLOOD PRESSURE: 87 MMHG

## 2022-12-19 DIAGNOSIS — Z23 NEED FOR VACCINATION: ICD-10-CM

## 2022-12-19 DIAGNOSIS — Z30.013 ENCOUNTER FOR INITIAL PRESCRIPTION OF INJECTABLE CONTRACEPTIVE: ICD-10-CM

## 2022-12-19 DIAGNOSIS — J02.9 ACUTE PHARYNGITIS, UNSPECIFIED ETIOLOGY: ICD-10-CM

## 2022-12-19 DIAGNOSIS — F98.8 ATTENTION DEFICIT DISORDER, UNSPECIFIED HYPERACTIVITY PRESENCE: Primary | ICD-10-CM

## 2022-12-19 DIAGNOSIS — J02.9 SORE THROAT: ICD-10-CM

## 2022-12-19 LAB
B-HCG UR QL: NEGATIVE
EXPIRATION DATE: NORMAL
EXPIRATION DATE: NORMAL
INTERNAL CONTROL: NORMAL
INTERNAL NEGATIVE CONTROL: NEGATIVE
INTERNAL POSITIVE CONTROL: POSITIVE
Lab: NORMAL
Lab: NORMAL
S PYO AG THROAT QL: NEGATIVE

## 2022-12-19 PROCEDURE — 99213 OFFICE O/P EST LOW 20 MIN: CPT | Performed by: FAMILY MEDICINE

## 2022-12-19 PROCEDURE — 96372 THER/PROPH/DIAG INJ SC/IM: CPT | Performed by: FAMILY MEDICINE

## 2022-12-19 PROCEDURE — 87880 STREP A ASSAY W/OPTIC: CPT | Performed by: FAMILY MEDICINE

## 2022-12-19 PROCEDURE — 81025 URINE PREGNANCY TEST: CPT | Performed by: FAMILY MEDICINE

## 2022-12-19 RX ORDER — AMOXICILLIN 500 MG/1
500 TABLET, FILM COATED ORAL 2 TIMES DAILY
Qty: 20 TABLET | Refills: 0 | Status: SHIPPED | OUTPATIENT
Start: 2022-12-19 | End: 2022-12-29

## 2022-12-19 RX ORDER — MEDROXYPROGESTERONE ACETATE 150 MG/ML
150 INJECTION, SUSPENSION INTRAMUSCULAR ONCE
Status: COMPLETED | OUTPATIENT
Start: 2022-12-19 | End: 2022-12-19

## 2022-12-19 RX ORDER — DEXTROAMPHETAMINE SACCHARATE, AMPHETAMINE ASPARTATE MONOHYDRATE, DEXTROAMPHETAMINE SULFATE AND AMPHETAMINE SULFATE 6.25; 6.25; 6.25; 6.25 MG/1; MG/1; MG/1; MG/1
25 CAPSULE, EXTENDED RELEASE ORAL EVERY MORNING
Qty: 30 CAPSULE | Refills: 0 | Status: SHIPPED | OUTPATIENT
Start: 2022-12-19 | End: 2022-12-28 | Stop reason: SDUPTHER

## 2022-12-19 RX ORDER — FLUCONAZOLE 150 MG/1
150 TABLET ORAL ONCE
Qty: 1 TABLET | Refills: 0 | Status: SHIPPED | OUTPATIENT
Start: 2022-12-19 | End: 2022-12-19

## 2022-12-19 RX ADMIN — MEDROXYPROGESTERONE ACETATE 150 MG: 150 INJECTION, SUSPENSION INTRAMUSCULAR at 10:03

## 2022-12-19 NOTE — ASSESSMENT & PLAN NOTE
Discussed birth control side effects, risks and compliance.  Urine pregnancy test negative.  Depo-Provera 150 mg IM given in the office.  Advise to use backup contraceptive method for few weeks.  Follow-up in 3 months.

## 2022-12-19 NOTE — PROGRESS NOTES
Subjective   Kassi Duran is a 27 y.o. female.     History of Present Illness  The patient present  for 3 months f/u on ADD and med refill. She is doing well,  denies difficulity concentration, focusing, anxiety, depression, and  insomnia.     The patient desires to discuss birth control.  The patient denies Hx of blood clots, smoking, history of stroke and high blood pressure.  The method she had tried in the past were  Depo shot and subdermal implant. She states a desire to initiate the Depo Provera injection.  Menstruation cycle regular. last LMP a week ago.     Sore Throat   This is a new problem. Episode onset: in the past 2 days. The problem has been gradually worsening. There has been no fever. Associated symptoms include trouble swallowing. Pertinent negatives include no abdominal pain, coughing, ear pain, headaches, hoarse voice, neck pain, shortness of breath, swollen glands or vomiting. She has tried nothing for the symptoms.        The following portions of the patient's history were reviewed and updated as appropriate: past medical history, past social history, past surgical history and problem list.    Review of Systems   Constitutional: Negative for appetite change and fever.   HENT: Positive for sore throat and trouble swallowing. Negative for ear pain, hoarse voice, sinus pressure and swollen glands.    Respiratory: Negative for cough and shortness of breath.    Gastrointestinal: Negative for abdominal pain, nausea and vomiting.   Genitourinary: Negative for frequency and menstrual problem.   Musculoskeletal: Negative for neck pain.   Psychiatric/Behavioral: Positive for stress. Negative for agitation, behavioral problems, decreased concentration, sleep disturbance and depressed mood.       Objective   Physical Exam  Vitals reviewed.   Constitutional:       Appearance: She is well-developed.   HENT:      Right Ear: Tympanic membrane, ear canal and external ear normal.      Left Ear: Tympanic  membrane, ear canal and external ear normal.      Mouth/Throat:      Mouth: Mucous membranes are moist.      Pharynx: Posterior oropharyngeal erythema present. No oropharyngeal exudate.   Eyes:      Conjunctiva/sclera: Conjunctivae normal.   Neck:      Thyroid: No thyromegaly.   Pulmonary:      Effort: Pulmonary effort is normal.      Breath sounds: Normal breath sounds. No wheezing.   Abdominal:      Tenderness: There is no abdominal tenderness.   Musculoskeletal:      Cervical back: Normal range of motion and neck supple. No tenderness.   Neurological:      Mental Status: She is alert and oriented to person, place, and time.   Psychiatric:         Mood and Affect: Mood normal.         Behavior: Behavior normal.       Vitals:    12/19/22 0905   BP: 138/87   Pulse: 78   Resp: 16   Temp: 97.5 °F (36.4 °C)   SpO2: 100%     Current Outpatient Medications on File Prior to Visit   Medication Sig Dispense Refill   • [DISCONTINUED] amphetamine-dextroamphetamine XR (Adderall XR) 25 MG 24 hr capsule Take 1 capsule by mouth Every Morning 30 capsule 0   • [DISCONTINUED] cyclobenzaprine (FLEXERIL) 10 MG tablet Take 1 tablet by mouth At Night As Needed for Muscle Spasms. 15 tablet 0   • [DISCONTINUED] HYDROcodone-acetaminophen (NORCO) 5-325 MG per tablet Take 1 tablet by mouth Every 6 (Six) Hours As Needed for Severe Pain. 12 tablet 0   • [DISCONTINUED] naproxen (EC NAPROSYN) 500 MG EC tablet Take 1 tablet by mouth 2 (Two) Times a Day As Needed (Pain). 14 tablet 0   • [DISCONTINUED] ondansetron ODT (ZOFRAN-ODT) 4 MG disintegrating tablet Place 1 tablet on the tongue Every 8 (Eight) Hours As Needed for Nausea. 10 tablet 0   • [DISCONTINUED] Scopolamine (Transderm-Scop, 1.5 MG,) 1 MG/3DAYS patch Place 1 patch on the skin as directed by provider Every 72 (Seventy-Two) Hours. 4 patch 0     No current facility-administered medications on file prior to visit.           Assessment & Plan   Problems Addressed this Visit        ENT     Sore throat    Acute pharyngitis     Advise symptom management salt water gargles, Tylenol /ibuprofen as needed.            Genitourinary and Reproductive     Encounter for initial prescription of injectable contraceptive     Discussed birth control side effects, risks and compliance.  Urine pregnancy test negative.  Depo-Provera 150 mg IM given in the office.  Advise to use backup contraceptive method for few weeks.  Follow-up in 3 months.            Mental Health    Attention deficit disorder - Primary     Psychological condition is improving with treatment.  Continue current dose of Adderall.  Psychological condition  will be reassessed in 3 months.         Relevant Medications    amphetamine-dextroamphetamine XR (Adderall XR) 25 MG 24 hr capsule   Other Visit Diagnoses     Need for vaccination          Diagnoses       Codes Comments    Attention deficit disorder, unspecified hyperactivity presence    -  Primary ICD-10-CM: F98.8  ICD-9-CM: 314.00     Sore throat     ICD-10-CM: J02.9  ICD-9-CM: 462     Encounter for initial prescription of injectable contraceptive     ICD-10-CM: Z30.013  ICD-9-CM: V25.02     Acute pharyngitis, unspecified etiology     ICD-10-CM: J02.9  ICD-9-CM: 462     Need for vaccination     ICD-10-CM: Z23  ICD-9-CM: V05.9

## 2022-12-19 NOTE — ASSESSMENT & PLAN NOTE
Psychological condition is improving with treatment.  Continue current dose of Adderall.  Psychological condition  will be reassessed in 3 months.

## 2022-12-28 DIAGNOSIS — F98.8 ATTENTION DEFICIT DISORDER, UNSPECIFIED HYPERACTIVITY PRESENCE: ICD-10-CM

## 2022-12-28 NOTE — TELEPHONE ENCOUNTER
Caller: Kassi Duran    Relationship: Self    Best call back number: [phone]    Requested Prescriptions:   Requested Prescriptions     Pending Prescriptions Disp Refills   • amphetamine-dextroamphetamine XR (Adderall XR) 25 MG 24 hr capsule 30 capsule 0     Sig: Take 1 capsule by mouth Every Morning        Pharmacy where request should be sent:  [facility]  [address] [facility]  [phone]    Does the patient have less than a 3 day supply:  [x] Yes  [] No    Would you like a call back once the refill request has been completed: [] Yes [x] No    If the office needs to give you a call back, can they leave a voicemail: [] Yes [x] No    Coni Hughes Rep   12/28/22 13:01 EST

## 2022-12-29 RX ORDER — DEXTROAMPHETAMINE SACCHARATE, AMPHETAMINE ASPARTATE MONOHYDRATE, DEXTROAMPHETAMINE SULFATE AND AMPHETAMINE SULFATE 6.25; 6.25; 6.25; 6.25 MG/1; MG/1; MG/1; MG/1
25 CAPSULE, EXTENDED RELEASE ORAL EVERY MORNING
Qty: 30 CAPSULE | Refills: 0 | Status: SHIPPED | OUTPATIENT
Start: 2022-12-29 | End: 2023-01-27 | Stop reason: SDUPTHER

## 2023-01-06 ENCOUNTER — TELEPHONE (OUTPATIENT)
Dept: FAMILY MEDICINE CLINIC | Facility: CLINIC | Age: 28
End: 2023-01-06

## 2023-01-06 RX ORDER — BENZONATATE 200 MG/1
200 CAPSULE ORAL 3 TIMES DAILY PRN
Qty: 20 CAPSULE | Refills: 0 | Status: SHIPPED | OUTPATIENT
Start: 2023-01-06 | End: 2023-03-17

## 2023-01-06 RX ORDER — ONDANSETRON 4 MG/1
4 TABLET, ORALLY DISINTEGRATING ORAL EVERY 8 HOURS PRN
Qty: 20 TABLET | Refills: 0 | Status: SHIPPED | OUTPATIENT
Start: 2023-01-06 | End: 2023-03-17

## 2023-01-06 NOTE — TELEPHONE ENCOUNTER
Caller: Kassi Duran    Relationship: Self    Best call back number: [phone]    What medication are you requesting: TESSALON PEARLS AND NAUSEA MEDICATION     What are your current symptoms: COVID POSITIVE  COUGH, RUNNY NOSE, HEADACHE, FATIGUE     How long have you been experiencing symptoms: 2 DAYS     Have you had these symptoms before:    [x] Yes  [] No    Have you been treated for these symptoms before:   [x] Yes  [] No    If a prescription is needed, what is your preferred pharmacy and phone number: [facility]/PHARMACY #[id number] - [place], [place] - [address] [facility] [phone] [facility] [phone] FX     Additional notes:    PATIENT WOULD LIKE TO KNOW IF MEDICATION COULD BE CALLED IN TO HELP WITH HER SYMPTOMS. PATIENT IS NOT SURE IF THERE IS A MEDICATION THAT COULD ALSO BE CALLED IN TO TREAT THE COVID

## 2023-01-27 ENCOUNTER — TELEPHONE (OUTPATIENT)
Dept: FAMILY MEDICINE CLINIC | Facility: CLINIC | Age: 28
End: 2023-01-27
Payer: COMMERCIAL

## 2023-01-27 DIAGNOSIS — F98.8 ATTENTION DEFICIT DISORDER, UNSPECIFIED HYPERACTIVITY PRESENCE: ICD-10-CM

## 2023-01-27 RX ORDER — DEXTROAMPHETAMINE SACCHARATE, AMPHETAMINE ASPARTATE MONOHYDRATE, DEXTROAMPHETAMINE SULFATE AND AMPHETAMINE SULFATE 6.25; 6.25; 6.25; 6.25 MG/1; MG/1; MG/1; MG/1
25 CAPSULE, EXTENDED RELEASE ORAL EVERY MORNING
Qty: 30 CAPSULE | Refills: 0 | Status: SHIPPED | OUTPATIENT
Start: 2023-01-27 | End: 2023-03-02 | Stop reason: SDUPTHER

## 2023-01-27 NOTE — TELEPHONE ENCOUNTER
Caller: Kassi Duran    Relationship: Self    Best call back number:    [phone] (Mobile)         Requested Prescriptions:   amphetamine-dextroamphetamine XR (Adderall XR) 25 MG 24 hr capsule     Pharmacy where request should be sent:[facility]/pharmacy #[id number] - [place], [place] - [address] [facility] - [phone]  - [phone] FX          Additional details provided by patient: PATIENT CALLED TO REQUEST A MEDICATION REFILL ON HER MEDICATION. PATIENT STATES THAT SHE HAS A 2 DAY SUPPLY LEFT.            Does the patient have less than a 3 day supply:  [x] Yes  [] No    Would you like a call back once the refill request has been completed: [x] Yes [] No    If the office needs to give you a call back, can they leave a voicemail: [x] Yes [] No    Coni Mai Rep   01/27/23 08:58 EST         THANKS

## 2023-03-02 DIAGNOSIS — F98.8 ATTENTION DEFICIT DISORDER, UNSPECIFIED HYPERACTIVITY PRESENCE: ICD-10-CM

## 2023-03-02 RX ORDER — DEXTROAMPHETAMINE SACCHARATE, AMPHETAMINE ASPARTATE MONOHYDRATE, DEXTROAMPHETAMINE SULFATE AND AMPHETAMINE SULFATE 6.25; 6.25; 6.25; 6.25 MG/1; MG/1; MG/1; MG/1
25 CAPSULE, EXTENDED RELEASE ORAL EVERY MORNING
Qty: 30 CAPSULE | Refills: 0 | Status: SHIPPED | OUTPATIENT
Start: 2023-03-02 | End: 2023-04-05 | Stop reason: SDUPTHER

## 2023-03-02 NOTE — TELEPHONE ENCOUNTER
Caller: Kassi Duran    Relationship: Self    Best call back number: [phone]    Requested Prescriptions:   Requested Prescriptions     Pending Prescriptions Disp Refills   • amphetamine-dextroamphetamine XR (Adderall XR) 25 MG 24 hr capsule 30 capsule 0     Sig: Take 1 capsule by mouth Every Morning        Pharmacy where request should be sent: [facility]/PHARMACY #[id number] - [place], [place] - [address] [facility] - [phone]  - [phone] FX     Additional details provided by patient:    Does the patient have less than a 3 day supply:  [x] Yes  [] No    Would you like a call back once the refill request has been completed: [x] Yes [] No    If the office needs to give you a call back, can they leave a voicemail: [x] Yes [] No    Coni Brown Rep   03/02/23 14:13 EST

## 2023-03-17 ENCOUNTER — OFFICE VISIT (OUTPATIENT)
Dept: FAMILY MEDICINE CLINIC | Facility: CLINIC | Age: 28
End: 2023-03-17
Payer: COMMERCIAL

## 2023-03-17 VITALS
WEIGHT: 193.2 LBS | SYSTOLIC BLOOD PRESSURE: 114 MMHG | HEIGHT: 63 IN | BODY MASS INDEX: 34.23 KG/M2 | RESPIRATION RATE: 16 BRPM | DIASTOLIC BLOOD PRESSURE: 85 MMHG | TEMPERATURE: 97.9 F | OXYGEN SATURATION: 97 % | HEART RATE: 68 BPM

## 2023-03-17 DIAGNOSIS — F98.8 ATTENTION DEFICIT DISORDER, UNSPECIFIED HYPERACTIVITY PRESENCE: Primary | ICD-10-CM

## 2023-03-17 DIAGNOSIS — Z30.9 ENCOUNTER FOR CONTRACEPTIVE MANAGEMENT, UNSPECIFIED TYPE: ICD-10-CM

## 2023-03-17 PROCEDURE — 96372 THER/PROPH/DIAG INJ SC/IM: CPT | Performed by: FAMILY MEDICINE

## 2023-03-17 PROCEDURE — 99213 OFFICE O/P EST LOW 20 MIN: CPT | Performed by: FAMILY MEDICINE

## 2023-03-17 RX ORDER — MEDROXYPROGESTERONE ACETATE 150 MG/ML
150 INJECTION, SUSPENSION INTRAMUSCULAR ONCE
Status: COMPLETED | OUTPATIENT
Start: 2023-03-17 | End: 2023-03-17

## 2023-03-17 RX ADMIN — MEDROXYPROGESTERONE ACETATE 150 MG: 150 INJECTION, SUSPENSION INTRAMUSCULAR at 10:05

## 2023-03-17 NOTE — PROGRESS NOTES
Subjective   Kassi Duran is a 28 y.o. female.     History of Present Illness     The patient present  for 3 months follow up on ADD. She denies difficulity concentration, focusing, anxiety, depression, and  insomnia.  She is taking Adderall and tolerating well.    Patient also here for follow-up on birth control.  She is currently on Depo shots and due for her shot today.  Patient has noticed  vaginal spotting after her first Depo shot 3-months ago which is stopped in few days.  Today she denies vaginal bleeding, any abdominal pain, nausea, vomiting and discharge.       The following portions of the patient's history were reviewed and updated as appropriate: past medical history, past social history, past surgical history and problem list.    Review of Systems   Constitutional: Negative for fatigue.   Genitourinary: Negative for frequency, menstrual problem, urgency, vaginal bleeding and vaginal discharge.   Psychiatric/Behavioral: Negative for decreased concentration, sleep disturbance and depressed mood. The patient is not nervous/anxious.        Objective   Physical Exam  Vitals reviewed.   Constitutional:       Appearance: She is well-developed.   Neck:      Thyroid: No thyromegaly.   Pulmonary:      Effort: Pulmonary effort is normal.      Breath sounds: Normal breath sounds. No wheezing.   Abdominal:      Tenderness: There is no abdominal tenderness.   Neurological:      Mental Status: She is alert and oriented to person, place, and time.   Psychiatric:         Mood and Affect: Mood normal.       Vitals:    03/17/23 0910   BP: 114/85   Pulse: 68   Resp: 16   Temp: 97.9 °F (36.6 °C)   SpO2: 97%     Current Outpatient Medications on File Prior to Visit   Medication Sig Dispense Refill   • amphetamine-dextroamphetamine XR (Adderall XR) 25 MG 24 hr capsule Take 1 capsule by mouth Every Morning 30 capsule 0   • [DISCONTINUED] benzonatate (TESSALON) 200 MG capsule Take 1 capsule by mouth 3 (Three) Times a Day  As Needed for Cough. (Patient not taking: Reported on 3/17/2023) 20 capsule 0   • [DISCONTINUED] ondansetron ODT (ZOFRAN-ODT) 4 MG disintegrating tablet Place 1 tablet on the tongue Every 8 (Eight) Hours As Needed for Nausea or Vomiting. (Patient not taking: Reported on 3/17/2023) 20 tablet 0     No current facility-administered medications on file prior to visit.           Assessment & Plan   Problems Addressed this Visit        Genitourinary and Reproductive     Encounter for contraceptive management     Depo-Provera shot given today.         Relevant Medications    medroxyPROGESTERone (DEPO-PROVERA) injection 150 mg (Completed)       Mental Health    Attention deficit disorder - Primary     Symptoms are stable continue current dose of Adderall.        Diagnoses       Codes Comments    Attention deficit disorder, unspecified hyperactivity presence    -  Primary ICD-10-CM: F98.8  ICD-9-CM: 314.00     Encounter for contraceptive management, unspecified type     ICD-10-CM: Z30.9  ICD-9-CM: V25.9

## 2023-03-23 ENCOUNTER — TELEPHONE (OUTPATIENT)
Dept: FAMILY MEDICINE CLINIC | Facility: CLINIC | Age: 28
End: 2023-03-23

## 2023-03-23 RX ORDER — AZITHROMYCIN 250 MG/1
TABLET, FILM COATED ORAL
Qty: 6 TABLET | Refills: 0 | Status: SHIPPED | OUTPATIENT
Start: 2023-03-23

## 2023-03-23 NOTE — TELEPHONE ENCOUNTER
Patient notified via voicemail. Advised to call back if she has any questions or concerns on 3/23 @ 1:19PM.

## 2023-03-23 NOTE — TELEPHONE ENCOUNTER
Please inform patient [facility] prescription has been sent.  Please make an appointment or go to urgent care if symptoms do not get better. Thanks

## 2023-03-23 NOTE — TELEPHONE ENCOUNTER
Caller: Kassi Duran    Relationship: Self    Best call back number: [phone]    What medication are you requesting: ANTIBIOTICS    What are your current symptoms: COUGH, GREEN MUCUS, NASAL CONGESTION, TROUBLE SLEEPING    How long have you been experiencing symptoms: ABOUT A WEEK    Have you had these symptoms before:    [x] Yes  [] No    Have you been treated for these symptoms before:   [x] Yes  [] No    If a prescription is needed, what is your preferred pharmacy and phone number: [facility] #[id number] - [place] AT [facility] OF  [address]  [address] - [phone]  - [phone] FX     Additional notes: PATIENT HAS BEEN TAKING DAYQUIL AND MUCINEX BUT THEY ARE NOT CLEARING SYMPTOMS UP.    PLEASE ADVISE

## 2023-04-05 DIAGNOSIS — F98.8 ATTENTION DEFICIT DISORDER, UNSPECIFIED HYPERACTIVITY PRESENCE: ICD-10-CM

## 2023-04-05 NOTE — TELEPHONE ENCOUNTER
Caller: Kassi Duran    Relationship: Self    Best call back number: [phone]    Requested Prescriptions:   Requested Prescriptions     Pending Prescriptions Disp Refills   • amphetamine-dextroamphetamine XR (Adderall XR) 25 MG 24 hr capsule 30 capsule 0     Sig: Take 1 capsule by mouth Every Morning      Pharmacy where request should be sent: [facility] #[id number] [place] [address] AT [place] [address] - [phone] [facility] [phone] FX     Last office visit with prescribing clinician: 3/17/2023   Last telemedicine visit with prescribing clinician: 6/5/2023   Next office visit with prescribing clinician: 6/5/2023     Additional details provided by patient: PT HAS 2 DAYS LEFT OF MEDICATION.     Does the patient have less than a 3 day supply:  [x] Yes  [] No    Would you like a call back once the refill request has been completed: [] Yes [x] No    If the office needs to give you a call back, can they leave a voicemail: [x] Yes [] No    Coni Wiley Rep   04/05/23 14:56 EDT

## 2023-04-08 RX ORDER — DEXTROAMPHETAMINE SACCHARATE, AMPHETAMINE ASPARTATE MONOHYDRATE, DEXTROAMPHETAMINE SULFATE AND AMPHETAMINE SULFATE 6.25; 6.25; 6.25; 6.25 MG/1; MG/1; MG/1; MG/1
25 CAPSULE, EXTENDED RELEASE ORAL EVERY MORNING
Qty: 30 CAPSULE | Refills: 0 | Status: SHIPPED | OUTPATIENT
Start: 2023-04-08 | End: 2023-04-14 | Stop reason: ALTCHOICE

## 2023-04-13 ENCOUNTER — TELEPHONE (OUTPATIENT)
Dept: FAMILY MEDICINE CLINIC | Facility: CLINIC | Age: 28
End: 2023-04-13

## 2023-04-13 NOTE — TELEPHONE ENCOUNTER
Please inform patient lot of pharmacies are out of stock  is she willing to to switch to Vyvanse or Concerta.

## 2023-04-13 NOTE — TELEPHONE ENCOUNTER
PATIENT STATES SHE HAS BEEN OUT OF HER MED > amphetamine-dextroamphetamine XR (Adderall XR) 25 MG 24 hr capsule FOR DAYS NOW AND REQUESTED IT TO BE REFILLED OVER A WEEK AGO BUT EVERY LOCAL PHARMACY IS COMPLETELY OUT OF IT. CAN DR. DALTON PRESCRIBE IT A DIFFERENT WAY THAT IT WOULD MORE LIKELY BE AVAILABLE OR CONTACT A PHARMACY THAT HAS IT IN STOCK. SHE IS VERY UPSET AS SHE FEELS HOPELESS AND AS SHE HAS TRIED FOR DAYS TO GET IT.     PATIENT> [phone]

## 2023-04-14 ENCOUNTER — TELEPHONE (OUTPATIENT)
Dept: PEDIATRICS | Facility: OTHER | Age: 28
End: 2023-04-14

## 2023-04-14 DIAGNOSIS — F98.8 ATTENTION DEFICIT DISORDER, UNSPECIFIED HYPERACTIVITY PRESENCE: Primary | ICD-10-CM

## 2023-04-14 RX ORDER — METHYLPHENIDATE HYDROCHLORIDE 27 MG/1
27 TABLET ORAL EVERY MORNING
Qty: 30 TABLET | Refills: 0 | Status: SHIPPED | OUTPATIENT
Start: 2023-04-14 | End: 2023-04-18

## 2023-04-14 NOTE — TELEPHONE ENCOUNTER
Caller: Kassi Duran    Relationship: Self    Best call back number: [phone]    What medication are you requesting: CONCERTA OR VYVANSE XR    Have you had these symptoms before:    [x] Yes  [] No    Have you been treated for these symptoms before:   [x] Yes  [] No    If a prescription is needed, what is your preferred pharmacy and phone number: [facility] #[id number] [place] [address] [address] [place] [address] - [phone]  - [phone] FX     Additional notes:    SHE IS FINE WITH SWITCHING MEDICATIONS, BUT WANTS TO MAKE SURE THAT IT IS GOING TO BE THE SAME STRENGTH EQUIVALENCE AND EXTENDED RELEASE.

## 2023-04-14 NOTE — TELEPHONE ENCOUNTER
Patient notified via voicemail and was advised to give us a call back regarding this information on 4/14 @ 4:06PM.

## 2023-04-14 NOTE — TELEPHONE ENCOUNTER
Please inform patient that I cannot restart her Concerta tablet at that high dose I can do 36 mg to start then we can titrate next month if needed.  Let me know if she agrees before I send the prescription.

## 2023-04-14 NOTE — TELEPHONE ENCOUNTER
Patient states she needs the Contreta 54 or 72. She cannot do the 27. It needs to be sent to [facility] in [place].

## 2023-04-14 NOTE — TELEPHONE ENCOUNTER
Patient notified via voicemail and was advised to give us a call back regarding this information on 4/14 @ 8:23AM.

## 2023-04-14 NOTE — TELEPHONE ENCOUNTER
Please inform patient concerta  27 mg prescription has been sent to [facility] pharmacy in [place]

## 2023-04-14 NOTE — TELEPHONE ENCOUNTER
Caller: Kassi Duran    Relationship: Self    Best call back number: [phone]    What was the call regarding: PATIENT CALLED BACK STATING SHE CAN NOT TAKE VYVANSE, SHE DOES NOT DO WELL ON IT.     ALSO PLEASE MAKE SURE IT IS THE SAME EQUIVALENT DOSAGE AMOUNT AS HER ADDERALL XR.     Do you require a callback: YES

## 2023-04-17 ENCOUNTER — TELEPHONE (OUTPATIENT)
Dept: FAMILY MEDICINE CLINIC | Facility: CLINIC | Age: 28
End: 2023-04-17

## 2023-04-17 DIAGNOSIS — F98.8 ATTENTION DEFICIT DISORDER, UNSPECIFIED HYPERACTIVITY PRESENCE: ICD-10-CM

## 2023-04-17 RX ORDER — METHYLPHENIDATE HYDROCHLORIDE 27 MG/1
27 TABLET ORAL EVERY MORNING
Qty: 30 TABLET | Refills: 0 | Status: CANCELLED | OUTPATIENT
Start: 2023-04-17

## 2023-04-17 NOTE — TELEPHONE ENCOUNTER
Caller: Kassi Duran    Relationship: Self    Best call back number: [phone]    Requested Prescriptions:   Requested Prescriptions     Pending Prescriptions Disp Refills   • methylphenidate (Concerta) 27 MG CR tablet 30 tablet 0     Sig: Take 1 tablet by mouth Every Morning        Pharmacy where request should be sent: [facility] #[id number] [place] [address] AT [place] [address] - [phone] [facility] [phone] FX     Last office visit with prescribing clinician: 3/17/2023   Last telemedicine visit with prescribing clinician: 6/5/2023   Next office visit with prescribing clinician: 6/5/2023     Additional details provided by patient: OUT OVER A WEEK    Does the patient have less than a 3 day supply:  [x] Yes  [] No    Would you like a call back once the refill request has been completed: [x] Yes [] No    If the office needs to give you a call back, can they leave a voicemail: [x] Yes [] No    Coni Conte Rep   04/17/23 11:52 EDT

## 2023-04-18 DIAGNOSIS — F98.8 ATTENTION DEFICIT DISORDER, UNSPECIFIED HYPERACTIVITY PRESENCE: Primary | ICD-10-CM

## 2023-04-18 RX ORDER — DEXTROAMPHETAMINE SACCHARATE, AMPHETAMINE ASPARTATE MONOHYDRATE, DEXTROAMPHETAMINE SULFATE AND AMPHETAMINE SULFATE 6.25; 6.25; 6.25; 6.25 MG/1; MG/1; MG/1; MG/1
25 CAPSULE, EXTENDED RELEASE ORAL EVERY MORNING
Qty: 30 CAPSULE | Refills: 0 | Status: SHIPPED | OUTPATIENT
Start: 2023-04-18

## 2023-04-18 NOTE — TELEPHONE ENCOUNTER
LEFT MESSAGE FOR PT ON CELL VM TO CALL BACK     THE [facility] HAS ADDERAL 25 MG CAPSULE.    IF SHE IS OK WITH THAT PHARM. DR. DALTON WILL CALL IN A NEW RX FOR THIS TO [facility].  SINCE ORIGINAL WAS SENT TO [facility].

## 2023-04-18 NOTE — TELEPHONE ENCOUNTER
Please call patient and let her know that I am okay to start her on Concerta 36 mg p.o. daily but I would not recommend to start with the higher dose that patient is requesting.

## 2023-04-18 NOTE — TELEPHONE ENCOUNTER
SPOKE WITH PT.    SHE IS FINE TO HAVE THIS SENT TO THE [place] PHARM.   THEY WILL NEED THE ADDERAL  RX SENT TO TH [facility]. SINCE SENT TO HER ORIGINAL PHAR IN [place].

## 2023-05-17 DIAGNOSIS — F98.8 ATTENTION DEFICIT DISORDER, UNSPECIFIED HYPERACTIVITY PRESENCE: ICD-10-CM

## 2023-05-17 NOTE — TELEPHONE ENCOUNTER
Incoming Refill Request      Medication requested (name and dose):   amphetamine-dextroamphetamine XR (Adderall XR) 25 MG 24 hr capsule  25 mg, Every Morning         Pharmacy where request should be sent: [facility]/pharmacy #[id number] - [place], [place]  [address] [facility] - [phone]  - [phone]   [phone]    Additional details provided by patient: NONE    Best call back number: [phone]    Does the patient have less than a 3 day supply:  [x] Yes  [] No    Coni Doss Rep  05/17/23, 13:54 EDT

## 2023-05-18 RX ORDER — DEXTROAMPHETAMINE SACCHARATE, AMPHETAMINE ASPARTATE MONOHYDRATE, DEXTROAMPHETAMINE SULFATE AND AMPHETAMINE SULFATE 6.25; 6.25; 6.25; 6.25 MG/1; MG/1; MG/1; MG/1
25 CAPSULE, EXTENDED RELEASE ORAL EVERY MORNING
Qty: 30 CAPSULE | Refills: 0 | Status: SHIPPED | OUTPATIENT
Start: 2023-05-18

## 2023-05-23 RX ORDER — SCOLOPAMINE TRANSDERMAL SYSTEM 1 MG/1
1 PATCH, EXTENDED RELEASE TRANSDERMAL
Qty: 4 PATCH | Refills: 0 | Status: SHIPPED | OUTPATIENT
Start: 2023-05-23

## 2023-05-23 NOTE — TELEPHONE ENCOUNTER
Caller: Kassi Duran    Relationship: Self    Best call back number: [phone]      Requested Prescriptions:   Requested Prescriptions     Pending Prescriptions Disp Refills   • Scopolamine (Transderm-Scop, 1.5 MG,) 1 MG/3DAYS patch 4 patch 0     Sig: Place 1 patch on the skin as directed by provider Every 72 (Seventy-Two) Hours.        Pharmacy where request should be sent: [facility]/PHARMACY #[id number] - [place], [place] - [address] [facility] - [phone] [facility] [phone] FX     Last office visit with prescribing clinician: 3/17/2023   Last telemedicine visit with prescribing clinician: Visit date not found   Next office visit with prescribing clinician: 6/1/2023     Does the patient have less than a 3 day supply:  [] Yes  [x] No    Would you like a call back once the refill request has been completed: [] Yes [] No    If the office needs to give you a call back, can they leave a voicemail: [] Yes [] No    Coni Jason Rep   05/23/23 15:01 EDT

## 2023-06-05 ENCOUNTER — TELEPHONE (OUTPATIENT)
Dept: FAMILY MEDICINE CLINIC | Facility: CLINIC | Age: 28
End: 2023-06-05

## 2023-06-05 NOTE — TELEPHONE ENCOUNTER
Caller: Kassi Duran    Relationship: Self    Best call back number: [phone]      PATIENT IS DUE FOR HER DEPO SHOT.    SHE HAS A PHYSICAL SET FOR 06/20/23 BUT IS UNABLE TO GET IN BEFORE THAT DATE AND WONDERED IF WE WOULD CALL THAT INTO [facility] FOR HER:    SHE IS DUE FOR THIS BETWEEN 06/3/23 AND JESUS 15 TH.    [facility] #[id number] - [place] [address] AT [place] [address] - [phone] [facility] [phone]  [phone]

## 2023-06-09 NOTE — TELEPHONE ENCOUNTER
LMTR[facility] to return call to schedule her DEPO     HUB OK TO SCHEDULE A NURSE VISIT FOR THE DEPO SHOT

## 2023-06-09 NOTE — TELEPHONE ENCOUNTER
SHE WOULD LIKE TO SET UP A TIME TO GET HER DEPO SHOT.  HER APPOINTMENT ON 6/5 GOT RESCHEDULED TILL 6/20 AND SHE NEEDS TO GET IT BEFORE THEN.  HER WINDOW IS UP ON TUESDAY 6/13/23.  PLEASE CALL HER [phone].

## 2023-06-09 NOTE — TELEPHONE ENCOUNTER
MINDA SAW MESSAGE TO SCHEDULE NURSE VISIT, HOWEVER, WE CANNOT SCHEDULE THOSE.  SHE WOULD LIKE A CALL BACK TO SCHEDULE PLEASE.  [phone].    ATTEMPTED TO WARM TRANSFER BUT NO ANSWER.

## 2023-06-12 ENCOUNTER — CLINICAL SUPPORT (OUTPATIENT)
Dept: FAMILY MEDICINE CLINIC | Facility: CLINIC | Age: 28
End: 2023-06-12
Payer: COMMERCIAL

## 2023-06-12 DIAGNOSIS — Z30.42 ENCOUNTER FOR SURVEILLANCE OF INJECTABLE CONTRACEPTIVE: Primary | ICD-10-CM

## 2023-06-12 RX ORDER — MEDROXYPROGESTERONE ACETATE 150 MG/ML
150 INJECTION, SUSPENSION INTRAMUSCULAR ONCE
Status: COMPLETED | OUTPATIENT
Start: 2023-06-12 | End: 2023-06-12

## 2023-06-12 RX ADMIN — MEDROXYPROGESTERONE ACETATE 150 MG: 150 INJECTION, SUSPENSION INTRAMUSCULAR at 09:23

## 2023-06-20 PROBLEM — Z00.00 ENCOUNTER FOR WELL ADULT EXAM WITHOUT ABNORMAL FINDINGS: Status: ACTIVE | Noted: 2023-03-17

## 2023-07-18 ENCOUNTER — TELEPHONE (OUTPATIENT)
Dept: FAMILY MEDICINE CLINIC | Facility: CLINIC | Age: 28
End: 2023-07-18

## 2023-07-18 NOTE — TELEPHONE ENCOUNTER
She will not going to need 40 pills for 30 days next month.  She can check with her pharmacy if they have Adderall available I can do the 10 pills now to make her through the full prescription and then next Month  when she is due we will do 30 pills.

## 2023-07-18 NOTE — TELEPHONE ENCOUNTER
Caller: Kassi Duran    Relationship: Self    Best call back number: [phone]    What medication are you requesting: amphetamine-dextroamphetamine XR (Adderall XR) 25 MG 24 hr capsule   QUANTITY OF 40 PILLS    LAST FILL WAS ONLY 20 PILLS BECAUSE THAT IS ALL [facility] HAD IN .STOCK. MARCO ANTONIO WOULD LIKE HER NEXT MONTHS FILLED WITH THE REMAINING 10 SO SHE DOESN'T HAVE TO PAY A DEDUCTIBLE      If a prescription is needed, what is your preferred pharmacy and phone number: [facility] #[id number] [facility] [address] [facility] [address] AT [facility] [address] - [phone]  - [phone] FX     Additional notes:

## 2023-07-19 NOTE — TELEPHONE ENCOUNTER
With normally doing the 30, she does her copay, but when she does the 20 then 10, she still has to still pay the copay, so that's why she is asking 40 so she don't have to pay two copays with just one prescription.

## 2023-07-20 NOTE — TELEPHONE ENCOUNTER
Patient called her pharmacy and stated if it can be sent to pharmacy [facility] in [place], [place]  Located in: [facility]  Address: [address] [id number]

## 2023-07-20 NOTE — TELEPHONE ENCOUNTER
EKG was faxed   Patient notified via voicemail and was advised to give us a call back regarding this information on 7/20 @ 10:49AM.

## 2023-07-24 ENCOUNTER — TELEPHONE (OUTPATIENT)
Dept: FAMILY MEDICINE CLINIC | Facility: CLINIC | Age: 28
End: 2023-07-24
Payer: COMMERCIAL

## 2023-07-24 DIAGNOSIS — F98.8 ATTENTION DEFICIT DISORDER, UNSPECIFIED HYPERACTIVITY PRESENCE: ICD-10-CM

## 2023-07-24 RX ORDER — DEXTROAMPHETAMINE SACCHARATE, AMPHETAMINE ASPARTATE MONOHYDRATE, DEXTROAMPHETAMINE SULFATE AND AMPHETAMINE SULFATE 6.25; 6.25; 6.25; 6.25 MG/1; MG/1; MG/1; MG/1
25 CAPSULE, EXTENDED RELEASE ORAL EVERY MORNING
Qty: 40 CAPSULE | Refills: 0 | Status: CANCELLED | OUTPATIENT
Start: 2023-07-24

## 2023-07-24 NOTE — TELEPHONE ENCOUNTER
Caller: Kanika Duranmarybeth CONTEH    Relationship: Self    Best call back number: [phone]     Requested Prescriptions:   Requested Prescriptions     Pending Prescriptions Disp Refills    amphetamine-dextroamphetamine XR (Adderall XR) 25 MG 24 hr capsule 40 capsule 0     Sig: Take 1 capsule by mouth Every Morning        Pharmacy where request should be sent: [id number] [place] [id number] AT [facility] [phone] [facility] [phone]      Last office visit with prescribing clinician: 6/20/2023   Last telemedicine visit with prescribing clinician: Visit date not found   Next office visit with prescribing clinician: 9/22/2023     Additional details provided by patient: PATIENT STATES SHE IS NEEDING THIS PRESCRIPTION SENT TO THIS PHARMACY AS SOON AS POSSIBLE DUE TO HER ORIGINAL PHARMACY BEING OUT OF STOCK. PATIENT STATES SHE HAS BEEN OUT FOR 4 DAYS.     Does the patient have less than a 3 day supply:  [x] Yes  [] No    Would you like a call back once the refill request has been completed: [x] Yes [] No    If the office needs to give you a call back, can they leave a voicemail: [x] Yes [] No    Coni Hernandez Rep   07/24/23 14:53 EDT

## 2023-07-24 NOTE — TELEPHONE ENCOUNTER
Patient called and left a voicemail stating that the pharmacy she wanted her prescription is sent to, but the [facility] in [place] has some and enough for a month supply, so can you send her refill there? Please advise.

## 2023-07-24 NOTE — TELEPHONE ENCOUNTER
Patient called on 7/18/2023 to send her prescription to this pharmacy and I have sent a refill on 7/20//2023  She need to stay with 1 pharmacy I cannot keep switching this controlled substance medications.     [facility] in [place], [place]  Located in: [facility]  Address: [address] [place] [address]

## 2023-07-25 ENCOUNTER — TELEPHONE (OUTPATIENT)
Dept: FAMILY MEDICINE CLINIC | Facility: CLINIC | Age: 28
End: 2023-07-25
Payer: COMMERCIAL

## 2023-07-25 DIAGNOSIS — F98.8 ATTENTION DEFICIT DISORDER, UNSPECIFIED HYPERACTIVITY PRESENCE: ICD-10-CM

## 2023-07-25 RX ORDER — DEXTROAMPHETAMINE SACCHARATE, AMPHETAMINE ASPARTATE MONOHYDRATE, DEXTROAMPHETAMINE SULFATE AND AMPHETAMINE SULFATE 7.5; 7.5; 7.5; 7.5 MG/1; MG/1; MG/1; MG/1
30 CAPSULE, EXTENDED RELEASE ORAL EVERY MORNING
Qty: 30 CAPSULE | Refills: 0 | Status: SHIPPED | OUTPATIENT
Start: 2023-07-25 | End: 2023-07-25 | Stop reason: SDUPTHER

## 2023-07-25 RX ORDER — DEXTROAMPHETAMINE SACCHARATE, AMPHETAMINE ASPARTATE MONOHYDRATE, DEXTROAMPHETAMINE SULFATE AND AMPHETAMINE SULFATE 7.5; 7.5; 7.5; 7.5 MG/1; MG/1; MG/1; MG/1
30 CAPSULE, EXTENDED RELEASE ORAL EVERY MORNING
Qty: 30 CAPSULE | Refills: 0 | Status: SHIPPED | OUTPATIENT
Start: 2023-07-25

## 2023-07-25 NOTE — TELEPHONE ENCOUNTER
Patient notified via voicemail. Advised to call back if she has any questions or concerns on 7/25 @ 8:15AM.

## 2023-07-25 NOTE — TELEPHONE ENCOUNTER
Please contact the patient check if she has handwritten prescription which she need to bring back if she wanted us to send the prescription to the pharmacy.

## 2023-07-25 NOTE — TELEPHONE ENCOUNTER
Caller: Kassi Duran    Relationship: Self    Best call back number:    [phone]       What is the best time to reach you: ANYTIME    Who are you requesting to speak with (clinical staff, provider,  specific staff member): CLINICAL    What was the call regarding: PATIENT CALLING STATING THAT [facility] IS NEEDING A ESCRIPT INSTEAD OF HER HAVING A WRITTEN PRESCRIPTION DUE TO THIS BEING A CONTROLED SUBSTANCE SHE WOULD LIKE TO KNOW IF THIS COULD BE SENT TO THE [facility] ON [address]     Is it okay if the provider responds through MyChart:

## 2023-07-25 NOTE — TELEPHONE ENCOUNTER
Patient needs to bring handwritten prescription back to the office to void  it before I send the E- prescription, thanks

## 2023-07-25 NOTE — TELEPHONE ENCOUNTER
I called the patient who stated that the hand written rx for Adderall XR 30 mg was taken to the [facility] pharmacy and  was told that we needed to e scribe the prescription because the hard copy ( hand written ) is not efficient. Per  Dr. Santiago patient needs to return the hand written RX given to her this morning in the office. Patient told me the pharmacist took it and I need you to call them and straighten this out. I told her I will call the pharmacy and will probably have to leave a message as the phone recording at [facility] automatically puts you to leave a message. I told her( Kassi ) that once the pharmacist calls me back and we can find out what needs to be done and its all taken care of We ( our) office will call her. She verbally understood.

## 2023-07-25 NOTE — TELEPHONE ENCOUNTER
Pharmacy Name: [facility] #[id number] - [place], [place] - [address] [facility] [facility] [place] [address] - [phone]  - [phone]      Pharmacy representative name: N/A    Pharmacy representative phone number: [phone]    What medication are you calling in regards to: ADDERALL    What question does the pharmacy have: PHARMACY SAID TENA CALLED AND LEFT A MESSAGE REGARDING THE ADDERALL REFILL    THE PHARMACY SAID THEY DID NOT RECEIVE A HARD COPY OF THE ADDERALL PRESCRIPTION FROM THE PATIENT TODAY AS FAR AS THEY CAN TELL    Who is the provider that prescribed the medication: DR. DALTON     Additional notes: N/A

## 2023-07-26 NOTE — TELEPHONE ENCOUNTER
[facility] [address] [facility] [address] CALLED AND SAID THEY HAVE THE ADDERALL FOR THIS PT AND CAN FILL IT, BUT NEEDED AN ESCRIPT OR AN E-WAIVER CODE TO FILL IT SINCE IT'S A CONTROLLED SUBSTANCE. I SPOKE WITH DR DALTON WHILE I HAD THE PHARMACY ON HOLD AND SHE ESCRIBED IT AT THAT TIME. IT WAS DONE ON 7/25/23 AROUND 3:00PM.

## 2023-07-31 RX ORDER — ONDANSETRON 4 MG/1
4 TABLET, ORALLY DISINTEGRATING ORAL EVERY 8 HOURS PRN
Qty: 14 TABLET | Refills: 0 | Status: SHIPPED | OUTPATIENT
Start: 2023-07-31

## 2023-08-22 DIAGNOSIS — F98.8 ATTENTION DEFICIT DISORDER, UNSPECIFIED HYPERACTIVITY PRESENCE: ICD-10-CM

## 2023-08-24 RX ORDER — DEXTROAMPHETAMINE SACCHARATE, AMPHETAMINE ASPARTATE MONOHYDRATE, DEXTROAMPHETAMINE SULFATE AND AMPHETAMINE SULFATE 7.5; 7.5; 7.5; 7.5 MG/1; MG/1; MG/1; MG/1
30 CAPSULE, EXTENDED RELEASE ORAL EVERY MORNING
Qty: 30 CAPSULE | Refills: 0 | Status: SHIPPED | OUTPATIENT
Start: 2023-08-24

## 2023-09-12 DIAGNOSIS — Z30.430 ENCOUNTER FOR IUD INSERTION: Primary | ICD-10-CM

## 2023-09-12 DIAGNOSIS — Z30.8 ENCOUNTER FOR OTHER CONTRACEPTIVE MANAGEMENT: ICD-10-CM

## 2023-09-22 ENCOUNTER — OFFICE VISIT (OUTPATIENT)
Dept: FAMILY MEDICINE CLINIC | Facility: CLINIC | Age: 28
End: 2023-09-22
Payer: COMMERCIAL

## 2023-09-22 ENCOUNTER — TELEPHONE (OUTPATIENT)
Dept: FAMILY MEDICINE CLINIC | Facility: CLINIC | Age: 28
End: 2023-09-22

## 2023-09-22 ENCOUNTER — LAB (OUTPATIENT)
Dept: FAMILY MEDICINE CLINIC | Facility: CLINIC | Age: 28
End: 2023-09-22
Payer: COMMERCIAL

## 2023-09-22 VITALS
BODY MASS INDEX: 36.14 KG/M2 | HEART RATE: 88 BPM | OXYGEN SATURATION: 97 % | DIASTOLIC BLOOD PRESSURE: 86 MMHG | WEIGHT: 204 LBS | RESPIRATION RATE: 16 BRPM | HEIGHT: 63 IN | SYSTOLIC BLOOD PRESSURE: 124 MMHG | TEMPERATURE: 97.9 F

## 2023-09-22 DIAGNOSIS — F98.8 ATTENTION DEFICIT DISORDER, UNSPECIFIED HYPERACTIVITY PRESENCE: ICD-10-CM

## 2023-09-22 DIAGNOSIS — Z00.00 ENCOUNTER FOR WELL ADULT EXAM WITHOUT ABNORMAL FINDINGS: ICD-10-CM

## 2023-09-22 PROCEDURE — 80053 COMPREHEN METABOLIC PANEL: CPT | Performed by: FAMILY MEDICINE

## 2023-09-22 PROCEDURE — 80061 LIPID PANEL: CPT | Performed by: FAMILY MEDICINE

## 2023-09-22 PROCEDURE — 36415 COLL VENOUS BLD VENIPUNCTURE: CPT | Performed by: FAMILY MEDICINE

## 2023-09-22 PROCEDURE — 85025 COMPLETE CBC W/AUTO DIFF WBC: CPT | Performed by: FAMILY MEDICINE

## 2023-09-22 RX ORDER — ONDANSETRON 4 MG/1
4 TABLET, ORALLY DISINTEGRATING ORAL EVERY 8 HOURS PRN
Qty: 14 TABLET | Refills: 0 | Status: SHIPPED | OUTPATIENT
Start: 2023-09-22

## 2023-09-22 RX ORDER — DEXTROAMPHETAMINE SACCHARATE, AMPHETAMINE ASPARTATE MONOHYDRATE, DEXTROAMPHETAMINE SULFATE AND AMPHETAMINE SULFATE 7.5; 7.5; 7.5; 7.5 MG/1; MG/1; MG/1; MG/1
30 CAPSULE, EXTENDED RELEASE ORAL EVERY MORNING
Qty: 30 CAPSULE | Refills: 0 | Status: SHIPPED | OUTPATIENT
Start: 2023-09-22

## 2023-09-22 NOTE — PROGRESS NOTES
Subjective   Kassi Duran is a 28 y.o. female.     History of Present Illness     The patient present  for 3 months f/u on ADD and med refill. She is doing well, tolerating adderall well , denies difficulity concentration,focusing, anxiety, depression, and  insomnia.   Patient unable to tolerate Depo shot due to vaginal bleeding.  She had a scheduled OB appointment next month.       The following portions of the patient's history were reviewed and updated as appropriate: past medical history, past social history, past surgical history and problem list.    Review of Systems   Gastrointestinal:  Negative for abdominal pain.   Psychiatric/Behavioral:  Negative for decreased concentration, sleep disturbance and depressed mood.      Objective   Physical Exam  Vitals reviewed.   Neurological:      Mental Status: She is oriented to person, place, and time.   Psychiatric:         Mood and Affect: Mood normal.     Vitals:    09/22/23 1505   BP: 124/86   Pulse: 88   Resp: 16   Temp: 97.9 °F (36.6 °C)   SpO2: 97%     Current Outpatient Medications on File Prior to Visit   Medication Sig Dispense Refill    [DISCONTINUED] amphetamine-dextroamphetamine XR (Adderall XR) 30 MG 24 hr capsule Take 1 capsule by mouth Every Morning 30 capsule 0    [DISCONTINUED] ondansetron ODT (ZOFRAN-ODT) 4 MG disintegrating tablet Place 1 tablet on the tongue Every 8 (Eight) Hours As Needed for Nausea or Vomiting. 14 tablet 0    [DISCONTINUED] Scopolamine (Transderm-Scop, 1.5 MG,) 1 MG/3DAYS patch Place 1 patch on the skin as directed by provider Every 72 (Seventy-Two) Hours. (Patient not taking: Reported on 9/22/2023) 4 patch 0     No current facility-administered medications on file prior to visit.           Assessment & Plan   Problems Addressed this Visit          Health Encounters    Encounter for well adult exam without abnormal findings       Mental Health    Attention deficit disorder     Psychological condition is improving with  treatment.  Continue Adderall.  Continue current treatment regimen.  Regular aerobic exercise.  Psychological condition  will be reassessed in 3 months.         Relevant Medications    amphetamine-dextroamphetamine XR (Adderall XR) 30 MG 24 hr capsule     Diagnoses         Codes Comments    Attention deficit disorder, unspecified hyperactivity presence     ICD-10-CM: F98.8  ICD-9-CM: 314.00     Encounter for well adult exam without abnormal findings     ICD-10-CM: Z00.00  ICD-9-CM: V70.0

## 2023-09-22 NOTE — TELEPHONE ENCOUNTER
THE HUB CALLED THROUGH AND ADVISED THAT THE PATIENT CALLED IN REGARDING HER APPT TODAY. SHE STATED THAT SHE CALLED HER INSURANCE, [employer], AND THEY ADVISED HER THAT SHE IS COVERED FOR HER OFFICE VISIT. I ADVISED THE HUB TO TELL HER THAT SHE CAN STILL COME IN, BUT IF [employer] DOES NOT COVER ALL/IF ANY OF HER VISIT, THAT SHE WILL BE RESPONSIBLE FOR THE OFFICE VISIT.

## 2023-09-23 LAB
ALBUMIN SERPL-MCNC: 4.6 G/DL (ref 3.5–5.2)
ALBUMIN/GLOB SERPL: 1.6 G/DL
ALP SERPL-CCNC: 68 U/L (ref 39–117)
ALT SERPL W P-5'-P-CCNC: 20 U/L (ref 1–33)
ANION GAP SERPL CALCULATED.3IONS-SCNC: 12 MMOL/L (ref 5–15)
AST SERPL-CCNC: 27 U/L (ref 1–32)
BASOPHILS # BLD AUTO: 0.04 10*3/MM3 (ref 0–0.2)
BASOPHILS NFR BLD AUTO: 0.6 % (ref 0–1.5)
BILIRUB SERPL-MCNC: 0.3 MG/DL (ref 0–1.2)
BUN SERPL-MCNC: 15 MG/DL (ref 6–20)
BUN/CREAT SERPL: 16.9 (ref 7–25)
CALCIUM SPEC-SCNC: 9.4 MG/DL (ref 8.6–10.5)
CHLORIDE SERPL-SCNC: 103 MMOL/L (ref 98–107)
CHOLEST SERPL-MCNC: 185 MG/DL (ref 0–200)
CO2 SERPL-SCNC: 24 MMOL/L (ref 22–29)
CREAT SERPL-MCNC: 0.89 MG/DL (ref 0.57–1)
DEPRECATED RDW RBC AUTO: 39.9 FL (ref 37–54)
EGFRCR SERPLBLD CKD-EPI 2021: 90.7 ML/MIN/1.73
EOSINOPHIL # BLD AUTO: 0.12 10*3/MM3 (ref 0–0.4)
EOSINOPHIL NFR BLD AUTO: 1.9 % (ref 0.3–6.2)
ERYTHROCYTE [DISTWIDTH] IN BLOOD BY AUTOMATED COUNT: 12 % (ref 12.3–15.4)
GLOBULIN UR ELPH-MCNC: 2.9 GM/DL
GLUCOSE SERPL-MCNC: 82 MG/DL (ref 65–99)
HCT VFR BLD AUTO: 41.7 % (ref 34–46.6)
HDLC SERPL-MCNC: 48 MG/DL (ref 40–60)
HGB BLD-MCNC: 14.3 G/DL (ref 12–15.9)
IMM GRANULOCYTES # BLD AUTO: 0.01 10*3/MM3 (ref 0–0.05)
IMM GRANULOCYTES NFR BLD AUTO: 0.2 % (ref 0–0.5)
LDLC SERPL CALC-MCNC: 119 MG/DL (ref 0–100)
LDLC/HDLC SERPL: 2.43 {RATIO}
LYMPHOCYTES # BLD AUTO: 2.43 10*3/MM3 (ref 0.7–3.1)
LYMPHOCYTES NFR BLD AUTO: 38.1 % (ref 19.6–45.3)
MCH RBC QN AUTO: 31.4 PG (ref 26.6–33)
MCHC RBC AUTO-ENTMCNC: 34.3 G/DL (ref 31.5–35.7)
MCV RBC AUTO: 91.6 FL (ref 79–97)
MONOCYTES # BLD AUTO: 0.48 10*3/MM3 (ref 0.1–0.9)
MONOCYTES NFR BLD AUTO: 7.5 % (ref 5–12)
NEUTROPHILS NFR BLD AUTO: 3.29 10*3/MM3 (ref 1.7–7)
NEUTROPHILS NFR BLD AUTO: 51.7 % (ref 42.7–76)
NRBC BLD AUTO-RTO: 0 /100 WBC (ref 0–0.2)
PLATELET # BLD AUTO: 414 10*3/MM3 (ref 140–450)
PMV BLD AUTO: 9.6 FL (ref 6–12)
POTASSIUM SERPL-SCNC: 4.4 MMOL/L (ref 3.5–5.2)
PROT SERPL-MCNC: 7.5 G/DL (ref 6–8.5)
RBC # BLD AUTO: 4.55 10*6/MM3 (ref 3.77–5.28)
SODIUM SERPL-SCNC: 139 MMOL/L (ref 136–145)
TRIGL SERPL-MCNC: 101 MG/DL (ref 0–150)
VLDLC SERPL-MCNC: 18 MG/DL (ref 5–40)
WBC NRBC COR # BLD: 6.37 10*3/MM3 (ref 3.4–10.8)

## 2023-10-23 DIAGNOSIS — F98.8 ATTENTION DEFICIT DISORDER, UNSPECIFIED HYPERACTIVITY PRESENCE: ICD-10-CM

## 2023-10-23 RX ORDER — DEXTROAMPHETAMINE SACCHARATE, AMPHETAMINE ASPARTATE MONOHYDRATE, DEXTROAMPHETAMINE SULFATE AND AMPHETAMINE SULFATE 7.5; 7.5; 7.5; 7.5 MG/1; MG/1; MG/1; MG/1
30 CAPSULE, EXTENDED RELEASE ORAL EVERY MORNING
Qty: 30 CAPSULE | Refills: 0 | Status: SHIPPED | OUTPATIENT
Start: 2023-10-23

## 2023-11-20 DIAGNOSIS — F98.8 ATTENTION DEFICIT DISORDER, UNSPECIFIED HYPERACTIVITY PRESENCE: ICD-10-CM

## 2023-11-21 RX ORDER — ONDANSETRON 4 MG/1
4 TABLET, ORALLY DISINTEGRATING ORAL EVERY 8 HOURS PRN
Qty: 14 TABLET | Refills: 0 | Status: SHIPPED | OUTPATIENT
Start: 2023-11-21

## 2023-11-21 RX ORDER — DEXTROAMPHETAMINE SACCHARATE, AMPHETAMINE ASPARTATE MONOHYDRATE, DEXTROAMPHETAMINE SULFATE AND AMPHETAMINE SULFATE 7.5; 7.5; 7.5; 7.5 MG/1; MG/1; MG/1; MG/1
30 CAPSULE, EXTENDED RELEASE ORAL EVERY MORNING
Qty: 30 CAPSULE | Refills: 0 | Status: SHIPPED | OUTPATIENT
Start: 2023-11-21

## 2023-12-20 DIAGNOSIS — F98.8 ATTENTION DEFICIT DISORDER, UNSPECIFIED HYPERACTIVITY PRESENCE: ICD-10-CM

## 2023-12-21 RX ORDER — ONDANSETRON 4 MG/1
4 TABLET, ORALLY DISINTEGRATING ORAL EVERY 8 HOURS PRN
Qty: 14 TABLET | Refills: 0 | Status: SHIPPED | OUTPATIENT
Start: 2023-12-21

## 2023-12-21 RX ORDER — DEXTROAMPHETAMINE SACCHARATE, AMPHETAMINE ASPARTATE MONOHYDRATE, DEXTROAMPHETAMINE SULFATE AND AMPHETAMINE SULFATE 7.5; 7.5; 7.5; 7.5 MG/1; MG/1; MG/1; MG/1
30 CAPSULE, EXTENDED RELEASE ORAL EVERY MORNING
Qty: 30 CAPSULE | Refills: 0 | Status: SHIPPED | OUTPATIENT
Start: 2023-12-21

## 2024-01-04 ENCOUNTER — OFFICE VISIT (OUTPATIENT)
Dept: FAMILY MEDICINE CLINIC | Facility: CLINIC | Age: 29
End: 2024-01-04
Payer: COMMERCIAL

## 2024-01-04 VITALS
DIASTOLIC BLOOD PRESSURE: 84 MMHG | WEIGHT: 205.2 LBS | HEART RATE: 95 BPM | SYSTOLIC BLOOD PRESSURE: 113 MMHG | OXYGEN SATURATION: 99 % | TEMPERATURE: 97.5 F | HEIGHT: 63 IN | RESPIRATION RATE: 16 BRPM | BODY MASS INDEX: 36.36 KG/M2

## 2024-01-04 DIAGNOSIS — F98.8 ATTENTION DEFICIT DISORDER, UNSPECIFIED HYPERACTIVITY PRESENCE: Primary | ICD-10-CM

## 2024-01-04 PROBLEM — J02.9 ACUTE PHARYNGITIS: Status: RESOLVED | Noted: 2022-12-19 | Resolved: 2024-01-04

## 2024-01-04 PROBLEM — J02.9 SORE THROAT: Status: RESOLVED | Noted: 2022-05-10 | Resolved: 2024-01-04

## 2024-01-04 PROCEDURE — 99213 OFFICE O/P EST LOW 20 MIN: CPT | Performed by: FAMILY MEDICINE

## 2024-01-04 NOTE — PROGRESS NOTES
Subjective   Kassi Duran is a 28 y.o. female.     History of Present Illness     The patient present  for 3 months f/u on ADD. Patient is doing well, denies difficulity concentration, focusing, anxiety, depression and  insomnia.  she is taking medication as prescribed and tolerating well.       The following portions of the patient's history were reviewed and updated as appropriate: past medical history, past social history, past surgical history and problem list.    Review of Systems   Psychiatric/Behavioral:  Negative for decreased concentration, sleep disturbance and depressed mood. The patient is not nervous/anxious.        Objective   Physical Exam  Vitals reviewed.   Neurological:      Mental Status: She is oriented to person, place, and time.   Psychiatric:         Mood and Affect: Mood normal.         Vitals:    01/04/24 1522   BP: 113/84   Pulse: 95   Resp: 16   Temp: 97.5 °F (36.4 °C)   SpO2: 99%     Current Outpatient Medications on File Prior to Visit   Medication Sig Dispense Refill    amphetamine-dextroamphetamine XR (Adderall XR) 30 MG 24 hr capsule Take 1 capsule by mouth Every Morning 30 capsule 0    ondansetron ODT (ZOFRAN-ODT) 4 MG disintegrating tablet Place 1 tablet on the tongue Every 8 (Eight) Hours As Needed for Nausea or Vomiting. 14 tablet 0     No current facility-administered medications on file prior to visit.         Assessment & Plan   Problems Addressed this Visit       Attention deficit disorder - Primary     ADD symptoms are stable- continue current dose of Adderall.  Psychological condition  will be reassessed in 3 months.          Diagnoses         Codes Comments    Attention deficit disorder, unspecified hyperactivity presence    -  Primary ICD-10-CM: F98.8  ICD-9-CM: 314.00

## 2024-01-04 NOTE — ASSESSMENT & PLAN NOTE
ADD symptoms are stable- continue current dose of Adderall.  Psychological condition  will be reassessed in 3 months.

## 2024-01-18 DIAGNOSIS — F98.8 ATTENTION DEFICIT DISORDER, UNSPECIFIED HYPERACTIVITY PRESENCE: ICD-10-CM

## 2024-01-18 RX ORDER — ONDANSETRON 4 MG/1
4 TABLET, ORALLY DISINTEGRATING ORAL EVERY 8 HOURS PRN
Qty: 14 TABLET | Refills: 0 | Status: SHIPPED | OUTPATIENT
Start: 2024-01-18

## 2024-01-18 RX ORDER — DEXTROAMPHETAMINE SACCHARATE, AMPHETAMINE ASPARTATE MONOHYDRATE, DEXTROAMPHETAMINE SULFATE AND AMPHETAMINE SULFATE 7.5; 7.5; 7.5; 7.5 MG/1; MG/1; MG/1; MG/1
30 CAPSULE, EXTENDED RELEASE ORAL EVERY MORNING
Qty: 30 CAPSULE | Refills: 0 | Status: SHIPPED | OUTPATIENT
Start: 2024-01-18

## 2024-01-25 ENCOUNTER — OFFICE VISIT (OUTPATIENT)
Dept: FAMILY MEDICINE CLINIC | Facility: CLINIC | Age: 29
End: 2024-01-25
Payer: COMMERCIAL

## 2024-01-25 VITALS
WEIGHT: 198.6 LBS | OXYGEN SATURATION: 98 % | DIASTOLIC BLOOD PRESSURE: 82 MMHG | RESPIRATION RATE: 16 BRPM | HEIGHT: 63 IN | BODY MASS INDEX: 35.19 KG/M2 | HEART RATE: 81 BPM | TEMPERATURE: 98.7 F | SYSTOLIC BLOOD PRESSURE: 119 MMHG

## 2024-01-25 DIAGNOSIS — R11.0 NAUSEA: Primary | ICD-10-CM

## 2024-01-25 DIAGNOSIS — K62.5 RECTAL BLEEDING: ICD-10-CM

## 2024-01-25 LAB
BASOPHILS # BLD AUTO: 0.03 10*3/MM3 (ref 0–0.2)
BASOPHILS NFR BLD AUTO: 0.4 % (ref 0–1.5)
BILIRUB UR QL STRIP: NEGATIVE
CLARITY UR: ABNORMAL
COLOR UR: ABNORMAL
DEPRECATED RDW RBC AUTO: 39.3 FL (ref 37–54)
EOSINOPHIL # BLD AUTO: 0.09 10*3/MM3 (ref 0–0.4)
EOSINOPHIL NFR BLD AUTO: 1.2 % (ref 0.3–6.2)
ERYTHROCYTE [DISTWIDTH] IN BLOOD BY AUTOMATED COUNT: 11.9 % (ref 12.3–15.4)
GLUCOSE UR STRIP-MCNC: NEGATIVE MG/DL
HCT VFR BLD AUTO: 42.7 % (ref 34–46.6)
HGB BLD-MCNC: 14.2 G/DL (ref 12–15.9)
HGB UR QL STRIP.AUTO: NEGATIVE
IMM GRANULOCYTES # BLD AUTO: 0.01 10*3/MM3 (ref 0–0.05)
IMM GRANULOCYTES NFR BLD AUTO: 0.1 % (ref 0–0.5)
KETONES UR QL STRIP: ABNORMAL
LEUKOCYTE ESTERASE UR QL STRIP.AUTO: NEGATIVE
LYMPHOCYTES # BLD AUTO: 2.62 10*3/MM3 (ref 0.7–3.1)
LYMPHOCYTES NFR BLD AUTO: 36 % (ref 19.6–45.3)
MCH RBC QN AUTO: 30 PG (ref 26.6–33)
MCHC RBC AUTO-ENTMCNC: 33.3 G/DL (ref 31.5–35.7)
MCV RBC AUTO: 90.3 FL (ref 79–97)
MONOCYTES # BLD AUTO: 0.63 10*3/MM3 (ref 0.1–0.9)
MONOCYTES NFR BLD AUTO: 8.7 % (ref 5–12)
NEUTROPHILS NFR BLD AUTO: 3.9 10*3/MM3 (ref 1.7–7)
NEUTROPHILS NFR BLD AUTO: 53.6 % (ref 42.7–76)
NITRITE UR QL STRIP: NEGATIVE
NRBC BLD AUTO-RTO: 0 /100 WBC (ref 0–0.2)
PH UR STRIP.AUTO: 5.5 [PH] (ref 5–8)
PLATELET # BLD AUTO: 441 10*3/MM3 (ref 140–450)
PMV BLD AUTO: 9.4 FL (ref 6–12)
PROT UR QL STRIP: ABNORMAL
RBC # BLD AUTO: 4.73 10*6/MM3 (ref 3.77–5.28)
SP GR UR STRIP: >=1.03 (ref 1–1.03)
UROBILINOGEN UR QL STRIP: ABNORMAL
WBC NRBC COR # BLD AUTO: 7.28 10*3/MM3 (ref 3.4–10.8)

## 2024-01-25 PROCEDURE — 81003 URINALYSIS AUTO W/O SCOPE: CPT | Performed by: FAMILY MEDICINE

## 2024-01-25 PROCEDURE — 83690 ASSAY OF LIPASE: CPT | Performed by: FAMILY MEDICINE

## 2024-01-25 PROCEDURE — 36415 COLL VENOUS BLD VENIPUNCTURE: CPT | Performed by: FAMILY MEDICINE

## 2024-01-25 PROCEDURE — 85025 COMPLETE CBC W/AUTO DIFF WBC: CPT | Performed by: FAMILY MEDICINE

## 2024-01-25 PROCEDURE — 80053 COMPREHEN METABOLIC PANEL: CPT | Performed by: FAMILY MEDICINE

## 2024-01-25 PROCEDURE — 99214 OFFICE O/P EST MOD 30 MIN: CPT | Performed by: FAMILY MEDICINE

## 2024-01-25 RX ORDER — PANTOPRAZOLE SODIUM 40 MG/1
40 TABLET, DELAYED RELEASE ORAL DAILY
Qty: 30 TABLET | Refills: 0 | Status: SHIPPED | OUTPATIENT
Start: 2024-01-25

## 2024-01-25 RX ORDER — ONDANSETRON 4 MG/1
4 TABLET, ORALLY DISINTEGRATING ORAL EVERY 8 HOURS PRN
Qty: 14 TABLET | Refills: 0 | Status: SHIPPED | OUTPATIENT
Start: 2024-01-25

## 2024-01-25 NOTE — PROGRESS NOTES
Subjective   Chief Complaint   Patient presents with    Nausea    Fatigue     Blood in stool x 1.5 weeks     Kassi Duran is a 28 y.o. female.     Patient Care Team:  Cristina Santiago MD as PCP - General (Family Medicine)    History of Present Illness  She is coming in today due to some GI issues which she has been experiencing for several days now.  She reports that for the last 1.5 weeks she has been noticing some blood mixed with stool with pretty much every bowel movement.  It is not really painful.  She denies any abdominal pain with it.  She also tells me that several years ago she had a colonoscopy due to some rectal bleeding and at that time she even had a hemorrhoidal banding done.  She also started with some nausea about 6 days ago.  No vomiting reported.  The nausea is pretty much constant and it is not triggered by any specific diet.  She is able to eat and drink.  No vomiting reported.  She has noted some fatigue.       The following portions of the patient's history were reviewed and updated as appropriate: allergies, current medications, past family history, past medical history, past social history, past surgical history, and problem list.  Past Medical History:   Diagnosis Date    Hernia, hiatal      No past surgical history on file.  The patient has a family history of  No family history on file.  Social History     Socioeconomic History    Marital status: Single   Tobacco Use    Smoking status: Never   Vaping Use    Vaping Use: Unknown       Review of Systems   Constitutional:  Positive for fatigue. Negative for activity change and fever.   Respiratory:  Negative for shortness of breath and wheezing.    Cardiovascular:  Negative for chest pain, palpitations and leg swelling.   Gastrointestinal:  Positive for anal bleeding and nausea. Negative for abdominal pain and vomiting.   Musculoskeletal:  Negative for arthralgias and back pain.   Skin:  Negative for rash.   Neurological:  Negative for  "tremors and headache.     Visit Vitals  /82 (BP Location: Left arm, Patient Position: Sitting, Cuff Size: Adult)   Pulse 81   Temp 98.7 °F (37.1 °C) (Infrared)   Resp 16   Ht 160 cm (63\")   Wt 90.1 kg (198 lb 9.6 oz)   SpO2 98%   BMI 35.18 kg/m²       Class 2 Severe Obesity (BMI >=35 and <=39.9). Obesity-related health conditions include the following: none. Obesity is unchanged. BMI is is above average; BMI management plan is completed. We discussed portion control and increasing exercise.      Current Outpatient Medications:     amphetamine-dextroamphetamine XR (Adderall XR) 30 MG 24 hr capsule, Take 1 capsule by mouth Every Morning, Disp: 30 capsule, Rfl: 0    ondansetron ODT (ZOFRAN-ODT) 4 MG disintegrating tablet, Place 1 tablet on the tongue Every 8 (Eight) Hours As Needed for Nausea or Vomiting., Disp: 14 tablet, Rfl: 0    pantoprazole (PROTONIX) 40 MG EC tablet, Take 1 tablet by mouth Daily., Disp: 30 tablet, Rfl: 0    Objective   Physical Exam  Constitutional:       General: She is not in acute distress.     Appearance: Normal appearance. She is well-developed. She is not ill-appearing or diaphoretic.      Comments: Patient is in no distress, patient has normal voice and speech.  Normal respiratory effort.   HENT:      Head: Normocephalic and atraumatic.   Pulmonary:      Effort: Pulmonary effort is normal.   Abdominal:      General: There is no distension.      Palpations: There is no mass.      Tenderness: There is abdominal tenderness. There is no right CVA tenderness, left CVA tenderness, guarding or rebound.      Hernia: No hernia is present.      Comments: There is some palpation tenderness in epigastric area noted.   Musculoskeletal:      Cervical back: Normal range of motion and neck supple.   Neurological:      General: No focal deficit present.      Mental Status: She is alert and oriented to person, place, and time. Mental status is at baseline.   Psychiatric:         Mood and Affect: Mood " normal.             Assessment & Plan   Diagnoses and all orders for this visit:    1. Nausea (Primary)  -     CBC Auto Differential  -     Comprehensive Metabolic Panel  -     Urinalysis With Culture If Indicated - Urine, Clean Catch  -     Lipase; Future  -     pantoprazole (PROTONIX) 40 MG EC tablet; Take 1 tablet by mouth Daily.  Dispense: 30 tablet; Refill: 0  -     ondansetron ODT (ZOFRAN-ODT) 4 MG disintegrating tablet; Place 1 tablet on the tongue Every 8 (Eight) Hours As Needed for Nausea or Vomiting.  Dispense: 14 tablet; Refill: 0  -     Lipase  -     Ambulatory Referral to Gastroenterology  -     [place] Urine Culture Tube - Urine, Clean Catch    2. Rectal bleeding  -     CBC Auto Differential  -     Ambulatory Referral to Gastroenterology      I will be getting some labs and checking urinalysis.  I will be also starting her on PPI and prescription for Zofran was also given.  I also reviewed colonoscopy from 8/2017, patient reported at that time also having rectal bleeding.  The colonoscopy did not show any signs of inflammatory bowel disease.  Patient had some hemorrhoids.  I will be referring her to see the GI as she possibly might need another colonoscopy, this time patient reports that blood is mixed with stool which is different from previous occasion.      Return if symptoms worsen or fail to improve, for Recheck.    Requested Prescriptions     Signed Prescriptions Disp Refills    pantoprazole (PROTONIX) 40 MG EC tablet 30 tablet 0     Sig: Take 1 tablet by mouth Daily.    ondansetron ODT (ZOFRAN-ODT) 4 MG disintegrating tablet 14 tablet 0     Sig: Place 1 tablet on the tongue Every 8 (Eight) Hours As Needed for Nausea or Vomiting.

## 2024-01-26 LAB
ALBUMIN SERPL-MCNC: 4.7 G/DL (ref 3.5–5.2)
ALBUMIN/GLOB SERPL: 1.6 G/DL
ALP SERPL-CCNC: 61 U/L (ref 39–117)
ALT SERPL W P-5'-P-CCNC: 17 U/L (ref 1–33)
ANION GAP SERPL CALCULATED.3IONS-SCNC: 13 MMOL/L (ref 5–15)
AST SERPL-CCNC: 17 U/L (ref 1–32)
BILIRUB SERPL-MCNC: 0.6 MG/DL (ref 0–1.2)
BUN SERPL-MCNC: 10 MG/DL (ref 6–20)
BUN/CREAT SERPL: 10.9 (ref 7–25)
CALCIUM SPEC-SCNC: 9.8 MG/DL (ref 8.6–10.5)
CHLORIDE SERPL-SCNC: 101 MMOL/L (ref 98–107)
CO2 SERPL-SCNC: 24 MMOL/L (ref 22–29)
CREAT SERPL-MCNC: 0.92 MG/DL (ref 0.57–1)
EGFRCR SERPLBLD CKD-EPI 2021: 87.2 ML/MIN/1.73
GLOBULIN UR ELPH-MCNC: 2.9 GM/DL
GLUCOSE SERPL-MCNC: 82 MG/DL (ref 65–99)
HOLD SPECIMEN: NORMAL
LIPASE SERPL-CCNC: 15 U/L (ref 13–60)
POTASSIUM SERPL-SCNC: 4.4 MMOL/L (ref 3.5–5.2)
PROT SERPL-MCNC: 7.6 G/DL (ref 6–8.5)
SODIUM SERPL-SCNC: 138 MMOL/L (ref 136–145)

## 2024-02-08 RX ORDER — AMOXICILLIN 500 MG/1
500 TABLET, FILM COATED ORAL 2 TIMES DAILY
Qty: 20 TABLET | Refills: 0 | Status: SHIPPED | OUTPATIENT
Start: 2024-02-08 | End: 2024-02-18

## 2024-02-22 DIAGNOSIS — F98.8 ATTENTION DEFICIT DISORDER, UNSPECIFIED HYPERACTIVITY PRESENCE: ICD-10-CM

## 2024-02-22 DIAGNOSIS — R11.0 NAUSEA: ICD-10-CM

## 2024-02-22 RX ORDER — DEXTROAMPHETAMINE SACCHARATE, AMPHETAMINE ASPARTATE MONOHYDRATE, DEXTROAMPHETAMINE SULFATE AND AMPHETAMINE SULFATE 7.5; 7.5; 7.5; 7.5 MG/1; MG/1; MG/1; MG/1
30 CAPSULE, EXTENDED RELEASE ORAL EVERY MORNING
Qty: 30 CAPSULE | Refills: 0 | Status: SHIPPED | OUTPATIENT
Start: 2024-02-22

## 2024-02-22 RX ORDER — ONDANSETRON 4 MG/1
4 TABLET, ORALLY DISINTEGRATING ORAL EVERY 8 HOURS PRN
Qty: 14 TABLET | Refills: 0 | OUTPATIENT
Start: 2024-02-22

## 2024-02-22 RX ORDER — PANTOPRAZOLE SODIUM 40 MG/1
40 TABLET, DELAYED RELEASE ORAL DAILY
Qty: 30 TABLET | Refills: 0 | OUTPATIENT
Start: 2024-02-22

## 2024-02-27 DIAGNOSIS — R11.0 NAUSEA: ICD-10-CM

## 2024-02-27 RX ORDER — ONDANSETRON 4 MG/1
4 TABLET, ORALLY DISINTEGRATING ORAL EVERY 8 HOURS PRN
Qty: 14 TABLET | Refills: 0 | Status: SHIPPED | OUTPATIENT
Start: 2024-02-27

## 2024-02-27 RX ORDER — PANTOPRAZOLE SODIUM 40 MG/1
40 TABLET, DELAYED RELEASE ORAL DAILY
Qty: 30 TABLET | Refills: 0 | Status: SHIPPED | OUTPATIENT
Start: 2024-02-27

## 2024-02-27 NOTE — TELEPHONE ENCOUNTER
Caller: ReneeKassi    Relationship: Self    Best call back number: [phone]     Requested Prescriptions:   Requested Prescriptions     Pending Prescriptions Disp Refills    pantoprazole (PROTONIX) 40 MG EC tablet 30 tablet 0     Sig: Take 1 tablet by mouth Daily.    ondansetron ODT (ZOFRAN-ODT) 4 MG disintegrating tablet 14 tablet 0     Sig: Place 1 tablet on the tongue Every 8 (Eight) Hours As Needed for Nausea or Vomiting.        Pharmacy where request should be sent: [facility] [address] [phone] [facility] [phone] FX     Last office visit with prescribing clinician: 1/4/2024   Last telemedicine visit with prescribing clinician: Visit date not found   Next office visit with prescribing clinician: Visit date not found     Does the patient have less than a 3 day supply:  [x] Yes  [] No    Coni Hernandez Rep   02/27/24 16:10 EST

## 2024-03-08 ENCOUNTER — TELEPHONE (OUTPATIENT)
Dept: FAMILY MEDICINE CLINIC | Facility: CLINIC | Age: 29
End: 2024-03-08

## 2024-03-08 NOTE — TELEPHONE ENCOUNTER
Caller: Kassi Duran    Relationship: Self    Best call back number: [phone]     What was the call regarding: PATIENT WOULD LIKE TO SPEAK WITH CLINICAL REGARDING HER APPOINTMENT, MEDICATIONS, AND PREGNANCY    PLEASE ADVISE

## 2024-08-22 ENCOUNTER — OFFICE VISIT (OUTPATIENT)
Dept: FAMILY MEDICINE CLINIC | Facility: CLINIC | Age: 29
End: 2024-08-22
Payer: COMMERCIAL

## 2024-08-22 VITALS
HEART RATE: 92 BPM | BODY MASS INDEX: 43.54 KG/M2 | OXYGEN SATURATION: 98 % | HEIGHT: 63 IN | WEIGHT: 245.7 LBS | SYSTOLIC BLOOD PRESSURE: 130 MMHG | TEMPERATURE: 97.7 F | DIASTOLIC BLOOD PRESSURE: 80 MMHG | RESPIRATION RATE: 16 BRPM

## 2024-08-22 DIAGNOSIS — J02.9 SORE THROAT: Primary | ICD-10-CM

## 2024-08-22 LAB
EXPIRATION DATE: NORMAL
EXPIRATION DATE: NORMAL
INTERNAL CONTROL: NORMAL
INTERNAL CONTROL: NORMAL
Lab: NORMAL
Lab: NORMAL
S PYO AG THROAT QL: NEGATIVE
SARS-COV-2 AG UPPER RESP QL IA.RAPID: NOT DETECTED

## 2024-08-22 PROCEDURE — 99213 OFFICE O/P EST LOW 20 MIN: CPT | Performed by: FAMILY MEDICINE

## 2024-08-22 PROCEDURE — 87880 STREP A ASSAY W/OPTIC: CPT | Performed by: FAMILY MEDICINE

## 2024-08-22 PROCEDURE — 87426 SARSCOV CORONAVIRUS AG IA: CPT | Performed by: FAMILY MEDICINE

## 2024-08-22 RX ORDER — PRENATAL WITH FERROUS FUM AND FOLIC ACID 3080; 920; 120; 400; 22; 1.84; 3; 20; 10; 1; 12; 200; 27; 25; 2 [IU]/1; [IU]/1; MG/1; [IU]/1; MG/1; MG/1; MG/1; MG/1; MG/1; MG/1; UG/1; MG/1; MG/1; MG/1; MG/1
1 TABLET ORAL DAILY
COMMUNITY

## 2024-08-22 NOTE — ASSESSMENT & PLAN NOTE
Rapid strep and COVID-19 test negative.  Discussed symptomatic management with fluids, Tylenol, salt water gargles and rest.  Call us back if symptoms do not get better.

## 2024-08-22 NOTE — PROGRESS NOTES
Piedad Duran is a 29 y.o. female.     History of Present Illness  Patient present with complaint of sore throat, body aches and fatigue.  Patient is 29 weeks pregnant.  Patient states she does have a known exposure to strep and COVID-19.  She denies fever and shortness of breath.  Sore Throat   This is a new problem. Episode onset: in the past 2 days. There has been no fever. The pain is mild. Associated symptoms include congestion and a hoarse voice. Pertinent negatives include no abdominal pain, coughing, ear pain, headaches, shortness of breath, trouble swallowing or vomiting. She has tried nothing for the symptoms.        The following portions of the patient's history were reviewed and updated as appropriate: past medical history, past social history, past surgical history and problem list.    Review of Systems   Constitutional:  Positive for fatigue. Negative for fever.   HENT:  Positive for congestion, hoarse voice and sore throat. Negative for ear pain and trouble swallowing.    Respiratory:  Negative for cough, shortness of breath and wheezing.    Gastrointestinal:  Positive for nausea. Negative for abdominal pain and vomiting.       Objective   Physical Exam  Vitals reviewed.   HENT:      Right Ear: Tympanic membrane, ear canal and external ear normal.      Left Ear: Tympanic membrane, ear canal and external ear normal.      Nose: No rhinorrhea.      Mouth/Throat:      Mouth: Mucous membranes are moist.      Pharynx: No posterior oropharyngeal erythema.   Cardiovascular:      Heart sounds: Normal heart sounds.   Pulmonary:      Effort: Pulmonary effort is normal.      Breath sounds: Normal breath sounds. No wheezing.   Neurological:      Mental Status: She is alert and oriented to person, place, and time.         Vitals:    08/22/24 1511   BP: 130/80   Pulse: 92   Resp: 16   Temp: 97.7 °F (36.5 °C)   SpO2: 98%     Current Outpatient Medications on File Prior to Visit   Medication Sig  Dispense Refill    ondansetron ODT (ZOFRAN-ODT) 4 MG disintegrating tablet Place 1 tablet on the tongue Every 8 (Eight) Hours As Needed for Nausea or Vomiting. 14 tablet 0    pantoprazole (PROTONIX) 40 MG EC tablet Take 1 tablet by mouth Daily. 30 tablet 0    Prenatal Vit-Fe Fumarate-FA (Prenatal 27-1) 27-1 MG tablet tablet Take 1 tablet by mouth Daily.      [DISCONTINUED] amphetamine-dextroamphetamine XR (Adderall XR) 30 MG 24 hr capsule Take 1 capsule by mouth Every Morning (Patient not taking: Reported on 8/22/2024) 30 capsule 0     No current facility-administered medications on file prior to visit.         Assessment & Plan   Problems Addressed this Visit       Sore throat - Primary     Rapid strep and COVID-19 test negative.  Discussed symptomatic management with fluids, Tylenol, salt water gargles and rest.  Call us back if symptoms do not get better.         Relevant Orders    POCT SARS-CoV-2 Antigen LISA (Completed)    POCT rapid strep A (Completed)     Diagnoses         Codes Comments    Sore throat    -  Primary ICD-10-CM: J02.9  ICD-9-CM: 462

## 2024-10-08 ENCOUNTER — TELEPHONE (OUTPATIENT)
Dept: FAMILY MEDICINE CLINIC | Facility: CLINIC | Age: 29
End: 2024-10-08

## 2024-10-08 NOTE — TELEPHONE ENCOUNTER
Left voice message for patient to make an appointment after her  delivery and she can make a telehealth visit if needed

## 2024-10-08 NOTE — TELEPHONE ENCOUNTER
Caller: Kassi Duran    Relationship: Self    Best call back number: [phone]     What medication are you requesting: ADDERALL 20 MG XR FIRST MONTH AND THEN UPPED TO 30 MG XR    If a prescription is needed, what is your preferred pharmacy and phone number:  [id number] - [place], [place] - [address] AT [facility]  & [facility] - [phone] [facility] [phone] FX     Additional notes: PATIENT STATES THAT SHE IS 2 WEEKS OUT FROM DELIVERING HER BABY AND WOULD LIKE TO RESUME HER ADHD MEDICATIONS AS SOON AS POSSIBLE AFTERWARDS.     PATIENT WOULD LIKE THIS SENT TO THE ABOVE PHARMACY SO THAT SHE HAS TIME FOR IT TO BE FILLED FOR ONCE SHE GIVES BIRTH.     PLEASE SEND TO ABOVE PHARMACY AND CONTACT PATIENT WITH ANY QUESTIONS OR CONCERNS.

## 2024-10-08 NOTE — TELEPHONE ENCOUNTER
Patient needs to schedule appointment after delivery because medication does have a risk in the breast-feeding.  Okay to schedule telehealth visit.  Thanks

## 2024-11-04 ENCOUNTER — OFFICE VISIT (OUTPATIENT)
Dept: FAMILY MEDICINE CLINIC | Facility: CLINIC | Age: 29
End: 2024-11-04
Payer: COMMERCIAL

## 2024-11-04 VITALS
SYSTOLIC BLOOD PRESSURE: 110 MMHG | DIASTOLIC BLOOD PRESSURE: 79 MMHG | BODY MASS INDEX: 41.27 KG/M2 | HEART RATE: 87 BPM | OXYGEN SATURATION: 98 % | HEIGHT: 63 IN | RESPIRATION RATE: 16 BRPM | TEMPERATURE: 97.3 F | WEIGHT: 232.9 LBS

## 2024-11-04 DIAGNOSIS — F90.0 ATTENTION DEFICIT HYPERACTIVITY DISORDER (ADHD), PREDOMINANTLY INATTENTIVE TYPE: Primary | ICD-10-CM

## 2024-11-04 PROCEDURE — 99213 OFFICE O/P EST LOW 20 MIN: CPT | Performed by: FAMILY MEDICINE

## 2024-11-04 RX ORDER — DEXTROAMPHETAMINE SACCHARATE, AMPHETAMINE ASPARTATE MONOHYDRATE, DEXTROAMPHETAMINE SULFATE AND AMPHETAMINE SULFATE 5; 5; 5; 5 MG/1; MG/1; MG/1; MG/1
20 CAPSULE, EXTENDED RELEASE ORAL EVERY MORNING
Qty: 30 CAPSULE | Refills: 0 | Status: SHIPPED | OUTPATIENT
Start: 2024-11-04

## 2024-11-04 NOTE — PROGRESS NOTES
Subjective   Kassi Duran is a 29 y.o. female.     History of Present Illness  Patient present for follow-up on ADD.  She is 10 days postpartum status post .  She was of off  Adderall due to pregnancy and would like to go back.  She is bottle feeding.  She is complaining of difficulty concentration, focusing and anxiety.       The following portions of the patient's history were reviewed and updated as appropriate: past medical history, past social history, past surgical history and problem list.    Review of Systems   Cardiovascular:  Negative for chest pain and palpitations.   Psychiatric/Behavioral:  Positive for decreased concentration. Negative for agitation, behavioral problems and depressed mood. The patient is nervous/anxious.        Objective   Physical Exam  Vitals reviewed.   Neurological:      Mental Status: She is alert and oriented to person, place, and time.   Psychiatric:         Mood and Affect: Mood normal.         Vitals:    24 1353   BP: 110/79   Pulse: 87   Resp: 16   Temp: 97.3 °F (36.3 °C)   SpO2: 98%     Current Outpatient Medications on File Prior to Visit   Medication Sig Dispense Refill    ondansetron ODT (ZOFRAN-ODT) 4 MG disintegrating tablet Place 1 tablet on the tongue Every 8 (Eight) Hours As Needed for Nausea or Vomiting. (Patient not taking: Reported on 2024) 14 tablet 0    pantoprazole (PROTONIX) 40 MG EC tablet Take 1 tablet by mouth Daily. (Patient not taking: Reported on 2024) 30 tablet 0    Prenatal Vit-Fe Fumarate-FA (Prenatal 27-1) 27-1 MG tablet tablet Take 1 tablet by mouth Daily. (Patient not taking: Reported on 2024)       No current facility-administered medications on file prior to visit.         Assessment & Plan   Problems Addressed this Visit       Attention deficit disorder - Primary     ADD symptoms are worsening since patient off Adderall due to pregnancy. She is a s/p  10 days ago and currently bottle feeding would  like to go back on Adderall.  Prescription sent Adderall XR 20 mg p.o. daily.  Follow-up in a month.         Relevant Medications    amphetamine-dextroamphetamine XR (Adderall XR) 20 MG 24 hr capsule     Diagnoses         Codes Comments    Attention deficit hyperactivity disorder (ADHD), predominantly inattentive type    -  Primary ICD-10-CM: F90.0  ICD-9-CM: 314.00

## 2024-11-04 NOTE — ASSESSMENT & PLAN NOTE
ADD symptoms are worsening since patient off Adderall due to pregnancy. She is a s/p  10 days ago and currently bottle feeding would like to go back on Adderall.  Prescription sent Adderall XR 20 mg p.o. daily.  Follow-up in a month.

## 2024-11-06 ENCOUNTER — TELEPHONE (OUTPATIENT)
Dept: FAMILY MEDICINE CLINIC | Facility: CLINIC | Age: 29
End: 2024-11-06

## 2024-11-06 NOTE — TELEPHONE ENCOUNTER
Caller: GUILLE    Relationship: Other    Best call back number:     [phone]       Which medication are you concerned about: amphetamine-dextroamphetamine XR (Adderall XR) 20 MG 24 hr capsule     Who prescribed you this medication: DR BLANCHARD      What are your concerns:     MEDICATION REQUIRES A PRIOR AUTHORIZATION    IT NEEDS TO BE SUBMITTED TO:    [employer] IS WHERE YOU WILL GET THE FORM FROM AND THE FAX NUMBER FOR RETURNING IT IS ON THE FORM    THE REQUIREMENT FOR THIS MEDICATION IS THAT THEY HAVE TRIED TWO OTHER GENERIC MEDICATION AND THEY DID NOT WORK.      ANY QUESTIONS PLEASE CALL GUILLE

## 2024-11-12 DIAGNOSIS — F90.0 ATTENTION DEFICIT HYPERACTIVITY DISORDER (ADHD), PREDOMINANTLY INATTENTIVE TYPE: ICD-10-CM

## 2024-11-12 RX ORDER — DEXTROAMPHETAMINE SACCHARATE, AMPHETAMINE ASPARTATE MONOHYDRATE, DEXTROAMPHETAMINE SULFATE AND AMPHETAMINE SULFATE 5; 5; 5; 5 MG/1; MG/1; MG/1; MG/1
20 CAPSULE, EXTENDED RELEASE ORAL EVERY MORNING
Qty: 30 CAPSULE | Refills: 0 | Status: SHIPPED | OUTPATIENT
Start: 2024-11-12

## 2024-12-09 ENCOUNTER — OFFICE VISIT (OUTPATIENT)
Dept: FAMILY MEDICINE CLINIC | Facility: CLINIC | Age: 29
End: 2024-12-09
Payer: COMMERCIAL

## 2024-12-09 VITALS
HEART RATE: 72 BPM | SYSTOLIC BLOOD PRESSURE: 103 MMHG | DIASTOLIC BLOOD PRESSURE: 76 MMHG | HEIGHT: 63 IN | BODY MASS INDEX: 40.43 KG/M2 | TEMPERATURE: 97.7 F | OXYGEN SATURATION: 96 % | WEIGHT: 228.2 LBS

## 2024-12-09 DIAGNOSIS — J02.9 SORE THROAT: ICD-10-CM

## 2024-12-09 DIAGNOSIS — R05.9 COUGH IN ADULT: ICD-10-CM

## 2024-12-09 DIAGNOSIS — F90.0 ATTENTION DEFICIT HYPERACTIVITY DISORDER (ADHD), PREDOMINANTLY INATTENTIVE TYPE: Primary | ICD-10-CM

## 2024-12-09 PROBLEM — J06.9 ACUTE URI: Status: ACTIVE | Noted: 2024-12-09

## 2024-12-09 LAB
EXPIRATION DATE: NORMAL
FLUAV AG UPPER RESP QL IA.RAPID: NOT DETECTED
FLUBV AG UPPER RESP QL IA.RAPID: NOT DETECTED
INTERNAL CONTROL: NORMAL
Lab: NORMAL
SARS-COV-2 AG UPPER RESP QL IA.RAPID: NOT DETECTED

## 2024-12-09 PROCEDURE — 87428 SARSCOV & INF VIR A&B AG IA: CPT | Performed by: FAMILY MEDICINE

## 2024-12-09 PROCEDURE — 99214 OFFICE O/P EST MOD 30 MIN: CPT | Performed by: FAMILY MEDICINE

## 2024-12-09 RX ORDER — DEXTROAMPHETAMINE SACCHARATE, AMPHETAMINE ASPARTATE MONOHYDRATE, DEXTROAMPHETAMINE SULFATE AND AMPHETAMINE SULFATE 6.25; 6.25; 6.25; 6.25 MG/1; MG/1; MG/1; MG/1
25 CAPSULE, EXTENDED RELEASE ORAL EVERY MORNING
Qty: 30 CAPSULE | Refills: 0 | Status: SHIPPED | OUTPATIENT
Start: 2024-12-09

## 2024-12-09 NOTE — PROGRESS NOTES
Subjective   Kassi Duran is a 29 y.o. female.     History of Present Illness     History of Present Illness  The patient is a 29-year-old female who presents for a 1-month follow-up on ADD and medication refill. She reports an improvement in her symptoms since starting Adderall last month, with increased focus and concentration.  She reports no chest pain, palpitations, or shortness of breath. Initially, she had some difficulty sleeping during the first week of medication, but this has since resolved. Her blood pressure is within normal range at 103/76, and she has lost 4 pounds since the last visit.  She also has been experiencing a runny nose for the past few days, which was accompanied by a sore throat and a non-productive cough. She reports no fever.     The following portions of the patient's history were reviewed and updated as appropriate: past medical history, past social history, past surgical history and problem list.    Review of Systems   Constitutional:  Negative for fever.   HENT:  Positive for congestion, postnasal drip, rhinorrhea and sore throat. Negative for sinus pressure and swollen glands.    Respiratory:  Positive for cough. Negative for shortness of breath and wheezing.    Cardiovascular:  Negative for chest pain.   Psychiatric/Behavioral:  Negative for decreased concentration and sleep disturbance. The patient is not nervous/anxious.        Results  Laboratory Studies  Rapid Covid and flu test negative.    Objective   Physical Exam  Vitals reviewed.   HENT:      Right Ear: Tympanic membrane, ear canal and external ear normal.      Left Ear: Tympanic membrane, ear canal and external ear normal.      Mouth/Throat:      Mouth: Mucous membranes are moist.      Pharynx: Posterior oropharyngeal erythema present.   Pulmonary:      Effort: Pulmonary effort is normal.      Breath sounds: Normal breath sounds. No wheezing.   Neurological:      Mental Status: She is alert and oriented to person,  place, and time.   Psychiatric:         Mood and Affect: Mood normal.         Behavior: Behavior normal.         Vitals:    12/09/24 1245   BP: 103/76   Pulse: 72   Temp: 97.7 °F (36.5 °C)   SpO2: 96%     Current Outpatient Medications on File Prior to Visit   Medication Sig Dispense Refill    [DISCONTINUED] amphetamine-dextroamphetamine XR (Adderall XR) 20 MG 24 hr capsule Take 1 capsule by mouth Every Morning 30 capsule 0    ondansetron ODT (ZOFRAN-ODT) 4 MG disintegrating tablet Place 1 tablet on the tongue Every 8 (Eight) Hours As Needed for Nausea or Vomiting. (Patient not taking: Reported on 12/9/2024) 14 tablet 0    pantoprazole (PROTONIX) 40 MG EC tablet Take 1 tablet by mouth Daily. (Patient not taking: Reported on 12/9/2024) 30 tablet 0    Prenatal Vit-Fe Fumarate-FA (Prenatal 27-1) 27-1 MG tablet tablet Take 1 tablet by mouth Daily. (Patient not taking: Reported on 11/4/2024)       No current facility-administered medications on file prior to visit.         Assessment & Plan   Problems Addressed this Visit       Attention deficit disorder - Primary    Relevant Medications    amphetamine-dextroamphetamine XR (Adderall XR) 25 MG 24 hr capsule    Sore throat    Relevant Orders    POCT SARS-CoV-2 Antigen LISA + Flu (Completed)    Cough in adult    Relevant Orders    POCT SARS-CoV-2 Antigen LISA + Flu (Completed)     Diagnoses         Codes Comments    Attention deficit hyperactivity disorder (ADHD), predominantly inattentive type    -  Primary ICD-10-CM: F90.0  ICD-9-CM: 314.00     Cough in adult     ICD-10-CM: R05.9  ICD-9-CM: 786.2     Sore throat     ICD-10-CM: J02.9  ICD-9-CM: 462             Assessment & Plan  1. Attention deficit disorder.  Symptoms are partially improving.  The dosage of Adderall will be increased from 20 mg to 25 mg.     2. Upper respiratory tract infection.  She reports symptoms of a runny nose for a couple of days, and a sore throat and nagging cough. Rapid COVID-19 and influenza test  will be conducted today.  Both test came back negative patient advised symptom management with Mucinex as needed for cough and Claritin as needed for postnasal drainage.           Patient or patient representative verbalized consent for the use of Ambient Listening during the visit with  Cristina Santiago MD for chart documentation. 12/9/2024  12:49 EST

## 2024-12-12 RX ORDER — AZITHROMYCIN 250 MG/1
TABLET, FILM COATED ORAL
Qty: 6 TABLET | Refills: 0 | Status: SHIPPED | OUTPATIENT
Start: 2024-12-12

## 2025-01-14 DIAGNOSIS — F90.0 ATTENTION DEFICIT HYPERACTIVITY DISORDER (ADHD), PREDOMINANTLY INATTENTIVE TYPE: ICD-10-CM

## 2025-01-15 RX ORDER — DEXTROAMPHETAMINE SACCHARATE, AMPHETAMINE ASPARTATE MONOHYDRATE, DEXTROAMPHETAMINE SULFATE AND AMPHETAMINE SULFATE 6.25; 6.25; 6.25; 6.25 MG/1; MG/1; MG/1; MG/1
25 CAPSULE, EXTENDED RELEASE ORAL EVERY MORNING
Qty: 30 CAPSULE | Refills: 0 | Status: SHIPPED | OUTPATIENT
Start: 2025-01-15

## 2025-02-14 DIAGNOSIS — F90.0 ATTENTION DEFICIT HYPERACTIVITY DISORDER (ADHD), PREDOMINANTLY INATTENTIVE TYPE: ICD-10-CM

## 2025-02-14 NOTE — TELEPHONE ENCOUNTER
Caller: Kassi Duran    Relationship to patient: Self    Best call back number:     Patient is needing: patient would like to get this medication filled today if possible because her pharmacy is closed on the weekends.    Please advise

## 2025-02-17 NOTE — TELEPHONE ENCOUNTER
Rx Refill Note  Requested Prescriptions     Pending Prescriptions Disp Refills    amphetamine-dextroamphetamine XR (Adderall XR) 25 MG 24 hr capsule 30 capsule 0     Sig: Take 1 capsule by mouth Every Morning      Last office visit with prescribing clinician: 12/9/2024   Last telemedicine visit with prescribing clinician: Visit date not found   Next office visit with prescribing clinician: 3/18/2025                         Would you like a call back once the refill request has been completed: [] Yes [] No    If the office needs to give you a call back, can they leave a voicemail: [] Yes [] No    Nataly Freitas MA  02/17/25, 08:32 EST

## 2025-02-24 DIAGNOSIS — F90.0 ATTENTION DEFICIT HYPERACTIVITY DISORDER (ADHD), PREDOMINANTLY INATTENTIVE TYPE: ICD-10-CM

## 2025-02-24 RX ORDER — DEXTROAMPHETAMINE SACCHARATE, AMPHETAMINE ASPARTATE MONOHYDRATE, DEXTROAMPHETAMINE SULFATE AND AMPHETAMINE SULFATE 6.25; 6.25; 6.25; 6.25 MG/1; MG/1; MG/1; MG/1
25 CAPSULE, EXTENDED RELEASE ORAL EVERY MORNING
Qty: 30 CAPSULE | Refills: 0 | Status: CANCELLED | OUTPATIENT
Start: 2025-02-24

## 2025-02-25 RX ORDER — DEXTROAMPHETAMINE SACCHARATE, AMPHETAMINE ASPARTATE MONOHYDRATE, DEXTROAMPHETAMINE SULFATE AND AMPHETAMINE SULFATE 6.25; 6.25; 6.25; 6.25 MG/1; MG/1; MG/1; MG/1
25 CAPSULE, EXTENDED RELEASE ORAL EVERY MORNING
Qty: 30 CAPSULE | Refills: 0 | Status: SHIPPED | OUTPATIENT
Start: 2025-02-25

## 2025-03-07 RX ORDER — AZITHROMYCIN 250 MG/1
TABLET, FILM COATED ORAL
Qty: 6 TABLET | Refills: 0 | OUTPATIENT
Start: 2025-03-07

## 2025-03-07 NOTE — TELEPHONE ENCOUNTER
Rx Refill Note  Requested Prescriptions     Pending Prescriptions Disp Refills    azithromycin (Zithromax Z-Grupo) 250 MG tablet 6 tablet 0     Sig: Take 2 tablets the first day, then 1 tablet daily for 4 days.      Last office visit with prescribing clinician: 12/9/2024   Last telemedicine visit with prescribing clinician: Visit date not found   Next office visit with prescribing clinician: 4/7/2025                         Would you like a call back once the refill request has been completed: [] Yes [] No    If the office needs to give you a call back, can they leave a voicemail: [] Yes [] No    Nataly Freitas MA  03/07/25, 11:38 EST

## 2025-03-22 DIAGNOSIS — F90.0 ATTENTION DEFICIT HYPERACTIVITY DISORDER (ADHD), PREDOMINANTLY INATTENTIVE TYPE: ICD-10-CM

## 2025-03-25 RX ORDER — DEXTROAMPHETAMINE SACCHARATE, AMPHETAMINE ASPARTATE MONOHYDRATE, DEXTROAMPHETAMINE SULFATE AND AMPHETAMINE SULFATE 6.25; 6.25; 6.25; 6.25 MG/1; MG/1; MG/1; MG/1
25 CAPSULE, EXTENDED RELEASE ORAL EVERY MORNING
Qty: 30 CAPSULE | Refills: 0 | Status: SHIPPED | OUTPATIENT
Start: 2025-03-25